# Patient Record
Sex: FEMALE | Race: WHITE
[De-identification: names, ages, dates, MRNs, and addresses within clinical notes are randomized per-mention and may not be internally consistent; named-entity substitution may affect disease eponyms.]

---

## 2017-01-21 ENCOUNTER — HOSPITAL ENCOUNTER (OUTPATIENT)
Age: 82
Discharge: HOME | End: 2017-01-21
Payer: MEDICARE

## 2017-01-21 DIAGNOSIS — M19.072: ICD-10-CM

## 2017-01-21 DIAGNOSIS — M79.672: Primary | ICD-10-CM

## 2017-03-14 ENCOUNTER — HOSPITAL ENCOUNTER (OUTPATIENT)
Dept: HOSPITAL 76 - DI | Age: 82
Discharge: HOME | End: 2017-03-14
Attending: FAMILY MEDICINE
Payer: MEDICARE

## 2017-03-14 DIAGNOSIS — M19.011: Primary | ICD-10-CM

## 2017-03-14 DIAGNOSIS — M19.021: ICD-10-CM

## 2017-03-15 NOTE — XRAY REPORT
THREE VIEW RIGHT ELBOW: 03/14/2017

 

CLINICAL HISTORY: Pain. 

 

FINDINGS:  Patchy bony demineralization is noted. 

 

There is no fracture, focus of destruction or malalignment. There is no intraarticular joint effusion
. 

 

IMPRESSION:  

1. NO ACUTE PROCESS.

2. MILD DEGENERATIVE CHANGES.

 

 

 

DD:03/15/2017 10:32:32  DT: 03/15/2017 10:50  JOB #: [id number]  EXT JOB #:[id number]

## 2017-03-15 NOTE — XRAY REPORT
THREE VIEW RIGHT SHOULDER: 03/14/2017

 

CLINICAL HISTORY: Pain. 

 

FINDINGS:  Degenerative changes are noted involving the glenohumeral joint with marked narrowing, sub
chondral sclerosis and osteophytic formation. Mild degenerative changes are present at the AC joint. 


 

There is no fracture or focus of destruction.

 

The soft tissues are within normal limits. 

 

The visualized lung apex is clear. 

 

NOTE: Diffuse demineralization does limit full characterization of the cortices. 

 

IMPRESSION:  ADVANCED DEGENERATIVE CHANGES OF THE GLENOHUMERAL JOINT. NO ACUTE FRACTURE OR MALALIGNME
NT.

 

 

 

DD:03/15/2017 10:32:00  DT: 03/15/2017 10:48  JOB #: [id number]  EXT JOB #:[id number]

## 2017-05-10 ENCOUNTER — HOSPITAL ENCOUNTER (OUTPATIENT)
Dept: HOSPITAL 76 - DI | Age: 82
Discharge: HOME | End: 2017-05-10
Attending: FAMILY MEDICINE
Payer: MEDICARE

## 2017-05-10 DIAGNOSIS — Z90.49: ICD-10-CM

## 2017-05-10 DIAGNOSIS — R10.9: Primary | ICD-10-CM

## 2017-05-10 PROCEDURE — 74020: CPT

## 2017-05-10 PROCEDURE — 71020: CPT

## 2017-05-10 NOTE — XRAY REPORT
TWO VIEW CHEST: 05/10/2017

 

CLINICAL INDICATION: Abdominal pain, possible foreign body. 

 

FINDINGS:  Frontal and lateral views of the chest demonstrate a normal cardiac silhouette. The lungs 
are clear. No effusion or pneumothorax is evident. 

 

IMPRESSION:  NO EVIDENCE OF ACUTE CARDIOPULMONARY DISEASE.

 

 

 

DD:05/10/2017 12:34:39  DT: 05/10/2017 12:47  JOB #: [id number]  EXT JOB #:[id number]

## 2017-05-10 NOTE — XRAY REPORT
TWO-VIEW ABDOMEN:  05/10/2017

 

CLINICAL INDICATION:  Pain, possible foreign body. 

 

FINDINGS:  Supine and upright views of the abdomen demonstrate a normal bowel gas pattern.  No small 
bowel dilatation is present.  Surgical clips from cholecystectomy are stable from CT of 09/16/2008.  
No radiopaque foreign body is appreciated in the soft tissues.  

 

IMPRESSION:  NO EVIDENCE OF BOWEL OBSTRUCTION OR PERFORATION.  POSTOPERATIVE CHANGES.  

 

 

 

DD:05/10/2017 13:24:12  DT: 05/10/2017 13:36  JOB #: [id number]  EXT JOB #:[id number]

## 2018-11-06 ENCOUNTER — HOSPITAL ENCOUNTER (EMERGENCY)
Dept: HOSPITAL 76 - ED | Age: 83
Discharge: HOME | End: 2018-11-06
Payer: MEDICARE

## 2018-11-06 VITALS — SYSTOLIC BLOOD PRESSURE: 143 MMHG | DIASTOLIC BLOOD PRESSURE: 70 MMHG

## 2018-11-06 DIAGNOSIS — E86.0: ICD-10-CM

## 2018-11-06 DIAGNOSIS — Z79.4: ICD-10-CM

## 2018-11-06 DIAGNOSIS — I48.91: ICD-10-CM

## 2018-11-06 DIAGNOSIS — L03.211: ICD-10-CM

## 2018-11-06 DIAGNOSIS — E11.65: ICD-10-CM

## 2018-11-06 DIAGNOSIS — T81.49XA: Primary | ICD-10-CM

## 2018-11-06 DIAGNOSIS — Z79.01: ICD-10-CM

## 2018-11-06 DIAGNOSIS — I10: ICD-10-CM

## 2018-11-06 LAB
ALBUMIN DIAFP-MCNC: 3.6 G/DL (ref 3.2–5.5)
ALBUMIN/GLOB SERPL: 1.1 {RATIO} (ref 1–2.2)
ALP SERPL-CCNC: 113 IU/L (ref 42–121)
ALT SERPL W P-5'-P-CCNC: 29 IU/L (ref 10–60)
ANION GAP SERPL CALCULATED.4IONS-SCNC: 9 MMOL/L (ref 6–13)
AST SERPL W P-5'-P-CCNC: 21 IU/L (ref 10–42)
BASOPHILS NFR BLD AUTO: 0 10^3/UL (ref 0–0.1)
BASOPHILS NFR BLD AUTO: 0.3 %
BILIRUB BLD-MCNC: 0.5 MG/DL (ref 0.2–1)
BUN SERPL-MCNC: 37 MG/DL (ref 6–20)
CALCIUM UR-MCNC: 8.9 MG/DL (ref 8.5–10.3)
CHLORIDE SERPL-SCNC: 95 MMOL/L (ref 101–111)
CLARITY UR REFRACT.AUTO: CLEAR
CO2 SERPL-SCNC: 28 MMOL/L (ref 21–32)
CREAT SERPLBLD-SCNC: 0.9 MG/DL (ref 0.4–1)
EOSINOPHIL # BLD AUTO: 0.1 10^3/UL (ref 0–0.7)
EOSINOPHIL NFR BLD AUTO: 1.3 %
ERYTHROCYTE [DISTWIDTH] IN BLOOD BY AUTOMATED COUNT: 19 % (ref 12–15)
GFRSERPLBLD MDRD-ARVRAT: 60 ML/MIN/{1.73_M2} (ref 89–?)
GLOBULIN SER-MCNC: 3.4 G/DL (ref 2.1–4.2)
GLUCOSE SERPL-MCNC: 219 MG/DL (ref 70–100)
GLUCOSE UR QL STRIP.AUTO: NEGATIVE MG/DL
HGB UR QL STRIP: 9 G/DL (ref 12–16)
INR PPP: 1.5 (ref 0.8–1.2)
KETONES UR QL STRIP.AUTO: NEGATIVE MG/DL
LIPASE SERPL-CCNC: 38 U/L (ref 22–51)
LYMPHOCYTES # SPEC AUTO: 0.8 10^3/UL (ref 1.5–3.5)
LYMPHOCYTES NFR BLD AUTO: 11.1 %
MCH RBC QN AUTO: 20.4 PG (ref 27–31)
MCHC RBC AUTO-ENTMCNC: 31 G/DL (ref 32–36)
MCV RBC AUTO: 65.7 FL (ref 81–99)
MONOCYTES # BLD AUTO: 0.9 10^3/UL (ref 0–1)
MONOCYTES NFR BLD AUTO: 12.2 %
NEUTROPHILS # BLD AUTO: 5.3 10^3/UL (ref 1.5–6.6)
NEUTROPHILS # SNV AUTO: 7.1 X10^3/UL (ref 4.8–10.8)
NEUTROPHILS NFR BLD AUTO: 75.1 %
NITRITE UR QL STRIP.AUTO: NEGATIVE
PDW BLD AUTO: 7.3 FL (ref 7.9–10.8)
PH UR STRIP.AUTO: 6 PH (ref 5–7.5)
PLAT MORPH BLD: (no result)
PLATELET # BLD: 274 10^3/UL (ref 130–450)
PLATELET BLD QL SMEAR: (no result)
PROT SPEC-MCNC: 7 G/DL (ref 6.7–8.2)
PROT UR STRIP.AUTO-MCNC: NEGATIVE MG/DL
PROTHROM ACT/NOR PPP: 16.3 SECS (ref 9.9–12.6)
RBC # UR STRIP.AUTO: NEGATIVE /UL
RBC # URNS HPF: (no result) /HPF (ref 0–5)
RBC MAR: 4.42 10^6/UL (ref 4.2–5.4)
RBC MORPH BLD: (no result)
SODIUM SERPLBLD-SCNC: 132 MMOL/L (ref 135–145)
SP GR UR STRIP.AUTO: 1.01 (ref 1–1.03)
SQUAMOUS URNS QL MICRO: (no result)
UROBILINOGEN UR QL STRIP.AUTO: (no result) E.U./DL
UROBILINOGEN UR STRIP.AUTO-MCNC: NEGATIVE MG/DL

## 2018-11-06 PROCEDURE — 83690 ASSAY OF LIPASE: CPT

## 2018-11-06 PROCEDURE — 99284 EMERGENCY DEPT VISIT MOD MDM: CPT

## 2018-11-06 PROCEDURE — 81001 URINALYSIS AUTO W/SCOPE: CPT

## 2018-11-06 PROCEDURE — 81003 URINALYSIS AUTO W/O SCOPE: CPT

## 2018-11-06 PROCEDURE — 80053 COMPREHEN METABOLIC PANEL: CPT

## 2018-11-06 PROCEDURE — 87086 URINE CULTURE/COLONY COUNT: CPT

## 2018-11-06 PROCEDURE — 85610 PROTHROMBIN TIME: CPT

## 2018-11-06 PROCEDURE — 71045 X-RAY EXAM CHEST 1 VIEW: CPT

## 2018-11-06 PROCEDURE — 36415 COLL VENOUS BLD VENIPUNCTURE: CPT

## 2018-11-06 PROCEDURE — 96360 HYDRATION IV INFUSION INIT: CPT

## 2018-11-06 PROCEDURE — 84484 ASSAY OF TROPONIN QUANT: CPT

## 2018-11-06 PROCEDURE — 93005 ELECTROCARDIOGRAM TRACING: CPT

## 2018-11-06 PROCEDURE — 85025 COMPLETE CBC W/AUTO DIFF WBC: CPT

## 2018-11-06 PROCEDURE — 83880 ASSAY OF NATRIURETIC PEPTIDE: CPT

## 2018-11-06 NOTE — ED PHYSICIAN DOCUMENTATION
History of Present Illness





- Stated complaint


Stated Complaint: DIZZY/WEAKNESS





- Chief complaint


Chief Complaint: General





- History obtained from


History obtained from: Patient, Family





- History of Present Illness


Timing: Today


Pain level max: 2


Pain level now: 2


Improved by: Rest


Worsened by: Walking





- Additonal information


Additional information: 





Patient is an 85-year-old female who presents to the emergency department 

stating that she had a basal cell carcinoma removed a few days ago and is 

concerned the wound is becoming infected.  Felt very weak today at home.  Has 

felt more out of breath with walking over the past few days.  Her blood sugars 

have been higher than usual. Does not have a history of acute coronary syndrome 

or congestive heart failure. No fevers.  No cough.  No vomiting.  No abdominal 

pain.





Review of Systems


Ten Systems: 10 systems reviewed and negative


Constitutional: denies: Fever, Chills


Ears: denies: Ear pain


Nose: denies: Rhinorrhea / runny nose, Congestion


Throat: denies: Sore throat


Cardiac: denies: Chest pain / pressure, Palpitations


Respiratory: denies: Cough, Hemoptysis, Wheezing


GI: denies: Abdominal Pain, Nausea, Vomiting


Skin: denies: Rash


Musculoskeletal: denies: Neck pain, Back pain





PD PAST MEDICAL HISTORY





- Past Medical History


Cardiovascular: Hypertension, High cholesterol, Atrial fibrillation


Respiratory: None


Endocrine/Autoimmune: Type 2 diabetes


GI: Hiatal hernia


: None


HEENT: None


Psych: None


Musculoskeletal: None


Derm: None





- Past Surgical History


Past Surgical History: Yes


General: Cholecystectomy, Appendectomy


Ortho: Other


/GYN: Hysterectomy


HEENT: Tonsil/Adenoidectomy





- Present Medications


Home Medications: 


                                Ambulatory Orders











 Medication  Instructions  Recorded  Confirmed


 


Ascorbic Acid [Vitamin C] 1,000 mg PO DAILY 10/14/14 12/10/15


 


Cholecalciferol (Vitamin D3) 1,000 unit PO DAILY 10/14/14 11/06/18





[Vitamin D]   


 


Digoxin [Lanoxin] 0.125 mg PO DAILY 10/14/14 11/06/18


 


Lansoprazole 15 mg PO DAILY 10/14/14 11/06/18


 


Lisinopril 10 mg PO DAILY 10/14/14 11/06/18


 


Magnesium Oxide [Magox 400] 400 mg PO DAILY 10/14/14 11/06/18


 


Multivitamin [Multi-Day Vitamins] 1 each PO DAILY 10/14/14 11/06/18


 


Omega-3/Dha/Epa/Fish Oil [Fish Oil] 1,000 mg PO BID 10/14/14 11/06/18


 


Vitamin E 400 unit PO DAILY 10/14/14 11/06/18


 


Warfarin [Coumadin] 7.5 mg PO 1400 10/14/14 11/06/18


 


Gluc/Wade-MSM#1/Vit C/Prem/Bor 1 tab ORAL DAILY 12/10/15 11/06/18





[Glucosa-Chond-MSM Complex Cplt]   


 


Insulin Glargine,Hum.rec.anlog 42 units SQ DAILY 12/10/15 11/06/18





[Lantus Solostar]   


 


Liraglutide [Victoza 2-Sam] 1.8 units SQ DAILY 12/10/15 11/06/18


 


Metoprolol Tartrate 50 mg ORAL Q8HR 12/10/15 11/06/18


 


Pravastatin Sodium 80 mg PO DAILY 12/10/15 11/06/18


 


Cephalexin [Keflex] 500 mg PO Q6H #28 capsule 11/06/18 


 


hydroCHLOROthiazide 0.5 tab ORAL DAILY 11/06/18 11/06/18





[Hydrochlorothiazide]   














- Allergies


Allergies/Adverse Reactions: 


                                    Allergies











Allergy/AdvReac Type Severity Reaction Status Date / Time


 


codeine AdvReac  Nausea Verified 11/06/18 12:09














- Social History


Does the pt smoke?: No


Smoking Status: Never smoker


Does the pt drink ETOH?: Yes


Does the pt have substance abuse?: No





- Immunizations


Immunizations are current?: Yes





PD ED PE NORMAL





- Vitals


Vital signs reviewed: Yes





- General


General: Alert and oriented X 3, No acute distress





- HEENT


HEENT: Other (Dry lips. Incision to the left upper forehead has moderate 

surrounding erythema, no drainage or fluctuance.  It is warm to the touch and 

painful.)





- Neck


Neck: Supple, no meningeal sign





- Cardiac


Cardiac: RRR, Strong equal pulses





- Respiratory


Respiratory: No respiratory distress, Clear bilaterally





- Abdomen


Abdomen: Soft, Non tender, Non distended





- Back


Back: No spinal TTP





- Derm


Derm: Warm and dry, No rash





- Extremities


Extremities: Other (1+ bilateral lower extremity edema)





- Neuro


Neuro: Alert and oriented X 3





- Psych


Psych: Normal mood, Normal affect





Results





- Vitals


Vitals: 


                               Vital Signs - 24 hr











  11/06/18 11/06/18 11/06/18





  12:01 12:30 13:11


 


Temperature 36.9 C  36.9 C


 


Heart Rate 74 67 70


 


Respiratory 16 13 16





Rate   


 


Blood Pressure 155/63 H 137/50 H 140/53 H


 


O2 Saturation 99 97 96














  11/06/18





  13:30


 


Temperature 


 


Heart Rate 71


 


Respiratory 16





Rate 


 


Blood Pressure 143/70 H


 


O2 Saturation 99








                                     Oxygen











O2 Source                      Room air

















- EKG (time done)


  ** 1217


Rate: Rate (enter#) (68)


Rhythm: NSR


Axis: Normal


Intervals: Normal SD


QRS: Normal


Ischemia: Normal ST segments, Q waves (V1-2)





- Labs


Labs: 


                                Laboratory Tests











  11/06/18 11/06/18 11/06/18





  12:05 12:05 12:05


 


WBC  7.1  


 


RBC  4.42  


 


Hgb  9.0 L  


 


Hct  29.0 L  


 


MCV  65.7 L  


 


MCH  20.4 L  


 


MCHC  31.0 L  


 


RDW  19.0 H  


 


Plt Count  274  


 


MPV  7.3 L  


 


Neut # (Auto)  5.3  


 


Lymph # (Auto)  0.8 L  


 


Mono # (Auto)  0.9  


 


Eos # (Auto)  0.1  


 


Baso # (Auto)  0.0  


 


Absolute Nucleated RBC  0.00  


 


Nucleated RBC %  0.1  


 


Manual Slide Review  Indicated  


 


Platelet Estimate  NORMAL (130-450,000)  


 


Platelet Morphology  NORMAL APPEARANCE  


 


RBC Morph Micro Appear  1+ OVALOCYTES  


 


PT   


 


INR   


 


Sodium   132 L 


 


Potassium   4.2 


 


Chloride   95 L 


 


Carbon Dioxide   28 


 


Anion Gap   9.0 


 


BUN   37 H 


 


Creatinine   0.9 


 


Estimated GFR (MDRD)   60 L 


 


Glucose   219 H 


 


POC Whole Bld Glucose   


 


Calcium   8.9 


 


Total Bilirubin   0.5 


 


AST   21 


 


ALT   29 


 


Alkaline Phosphatase   113 


 


Troponin I    < 0.04


 


B-Natriuretic Peptide   


 


Total Protein   7.0 


 


Albumin   3.6 


 


Globulin   3.4 


 


Albumin/Globulin Ratio   1.1 


 


Lipase   38 


 


Urine Color   


 


Urine Clarity   


 


Urine pH   


 


Ur Specific Gravity   


 


Urine Protein   


 


Urine Glucose (UA)   


 


Urine Ketones   


 


Urine Occult Blood   


 


Urine Nitrite   


 


Urine Bilirubin   


 


Urine Urobilinogen   


 


Ur Leukocyte Esterase   


 


Urine RBC   


 


Urine WBC   


 


Ur Squamous Epith Cells   


 


Urine Bacteria   


 


Ur Microscopic Review   


 


Urine Culture Comments   














  11/06/18 11/06/18 11/06/18





  12:05 12:05 12:07


 


WBC   


 


RBC   


 


Hgb   


 


Hct   


 


MCV   


 


MCH   


 


MCHC   


 


RDW   


 


Plt Count   


 


MPV   


 


Neut # (Auto)   


 


Lymph # (Auto)   


 


Mono # (Auto)   


 


Eos # (Auto)   


 


Baso # (Auto)   


 


Absolute Nucleated RBC   


 


Nucleated RBC %   


 


Manual Slide Review   


 


Platelet Estimate   


 


Platelet Morphology   


 


RBC Morph Micro Appear   


 


PT   16.3 H 


 


INR   1.5 H 


 


Sodium   


 


Potassium   


 


Chloride   


 


Carbon Dioxide   


 


Anion Gap   


 


BUN   


 


Creatinine   


 


Estimated GFR (MDRD)   


 


Glucose   


 


POC Whole Bld Glucose    220 H


 


Calcium   


 


Total Bilirubin   


 


AST   


 


ALT   


 


Alkaline Phosphatase   


 


Troponin I   


 


B-Natriuretic Peptide  43  


 


Total Protein   


 


Albumin   


 


Globulin   


 


Albumin/Globulin Ratio   


 


Lipase   


 


Urine Color   


 


Urine Clarity   


 


Urine pH   


 


Ur Specific Gravity   


 


Urine Protein   


 


Urine Glucose (UA)   


 


Urine Ketones   


 


Urine Occult Blood   


 


Urine Nitrite   


 


Urine Bilirubin   


 


Urine Urobilinogen   


 


Ur Leukocyte Esterase   


 


Urine RBC   


 


Urine WBC   


 


Ur Squamous Epith Cells   


 


Urine Bacteria   


 


Ur Microscopic Review   


 


Urine Culture Comments   














  11/06/18





  14:00


 


WBC 


 


RBC 


 


Hgb 


 


Hct 


 


MCV 


 


MCH 


 


MCHC 


 


RDW 


 


Plt Count 


 


MPV 


 


Neut # (Auto) 


 


Lymph # (Auto) 


 


Mono # (Auto) 


 


Eos # (Auto) 


 


Baso # (Auto) 


 


Absolute Nucleated RBC 


 


Nucleated RBC % 


 


Manual Slide Review 


 


Platelet Estimate 


 


Platelet Morphology 


 


RBC Morph Micro Appear 


 


PT 


 


INR 


 


Sodium 


 


Potassium 


 


Chloride 


 


Carbon Dioxide 


 


Anion Gap 


 


BUN 


 


Creatinine 


 


Estimated GFR (MDRD) 


 


Glucose 


 


POC Whole Bld Glucose 


 


Calcium 


 


Total Bilirubin 


 


AST 


 


ALT 


 


Alkaline Phosphatase 


 


Troponin I 


 


B-Natriuretic Peptide 


 


Total Protein 


 


Albumin 


 


Globulin 


 


Albumin/Globulin Ratio 


 


Lipase 


 


Urine Color  YELLOW


 


Urine Clarity  CLEAR


 


Urine pH  6.0


 


Ur Specific Gravity  1.010


 


Urine Protein  NEGATIVE


 


Urine Glucose (UA)  NEGATIVE


 


Urine Ketones  NEGATIVE


 


Urine Occult Blood  NEGATIVE


 


Urine Nitrite  NEGATIVE


 


Urine Bilirubin  NEGATIVE


 


Urine Urobilinogen  0.2 (NORMAL)


 


Ur Leukocyte Esterase  TRACE H


 


Urine RBC  0-5


 


Urine WBC  4-5


 


Ur Squamous Epith Cells  NONE SEEN


 


Urine Bacteria  None Seen


 


Ur Microscopic Review  INDICATED


 


Urine Culture Comments  INDICATED














- Rads (name of study)


  ** cxr


Radiology: Prelim report reviewed, EMP read contemporaneously, See rad report 

(normal)





PD MEDICAL DECISION MAKING





- ED course


Complexity details: reviewed results, re-evaluated patient, considered 

differential, d/w patient, d/w family


ED course: 





Patient is an 85-year-old female with what appears to be hyperglycemia leading 

to dehydration as well as a mild cellulitis to recent surgical wound on her left

 upper forehead.  Will place on Keflex.  Feels much better after IV fluids.  

Tolerating p.o. without difficulty.  No focal neurological deficits.  No acute 

laboratory abnormalities.  She is well-appearing, nontoxic.  No evidence of 

sepsis.  Patient counseled regarding signs and symptoms for which I believe and 

urgent re-evaluation would be necessary. Patient with good understanding of and 

agreement to plan and is comfortable going home at this time





This document was made in part using voice recognition software. While efforts 

are made to proofread this document, sound alike and grammatical errors may occu

rJohn





Departure





- Departure


Disposition: 01 Home, Self Care


Clinical Impression: 


 Wound infection after surgery, Dehydration





Cellulitis


Qualifiers:


 Site of cellulitis: face Qualified Code(s): L03.211 - Cellulitis of face





Condition: Good


Instructions:  ED Infec Skin Cellulitis


Follow-Up: 


Michael Hester MD [Primary Care Provider] - Within 3 Days


Prescriptions: 


Cephalexin [Keflex] 500 mg PO Q6H #28 capsule


Comments: 


Take all antibiotics until gone. Return if you worsen. 


Discharge Date/Time: 11/06/18 14:15

## 2018-11-06 NOTE — XRAY REPORT
Reason:  dyspnea

Procedure Date:  11/06/2018   

Accession Number:  [id number] / [id number]                    

Procedure:  XR  - Chest 1 View X-Ray CPT Code:  30639

 

FULL RESULT:

 

 

EXAM:

CHEST RADIOGRAPHY

 

EXAM DATE: 11/6/2018 01:18 PM.

 

CLINICAL HISTORY: Dyspnea.

 

COMPARISON: CHEST 2 VIEW PA/LAT 05/10/2017 11:59 AM.

 

TECHNIQUE: 1 view.

 

FINDINGS:

Lungs/Pleura: No focal opacities evident. No pleural effusion. No 

pneumothorax.

 

Mediastinum: Within exam limitations, the cardiomediastinal contour is 

normal.

 

Other: None.

 

IMPRESSION: Normal single view chest.

 

[facility]

## 2018-11-23 ENCOUNTER — HOSPITAL ENCOUNTER (OUTPATIENT)
Dept: HOSPITAL 76 - EMS | Age: 83
End: 2018-11-23
Attending: SURGERY
Payer: MEDICARE

## 2018-11-23 DIAGNOSIS — Z03.89: Primary | ICD-10-CM

## 2020-05-13 ENCOUNTER — HOSPITAL ENCOUNTER (OUTPATIENT)
Dept: HOSPITAL 76 - COV | Age: 85
Discharge: HOME | End: 2020-05-13
Attending: FAMILY MEDICINE
Payer: MEDICARE

## 2020-05-13 DIAGNOSIS — R06.02: ICD-10-CM

## 2020-05-13 DIAGNOSIS — R53.83: ICD-10-CM

## 2020-05-13 DIAGNOSIS — J02.9: ICD-10-CM

## 2020-05-13 DIAGNOSIS — R05: Primary | ICD-10-CM

## 2020-05-13 PROCEDURE — 81599 UNLISTED MAAA: CPT

## 2020-08-15 ENCOUNTER — HOSPITAL ENCOUNTER (EMERGENCY)
Dept: HOSPITAL 76 - ED | Age: 85
Discharge: HOME | End: 2020-08-15
Payer: MEDICARE

## 2020-08-15 VITALS — SYSTOLIC BLOOD PRESSURE: 149 MMHG | DIASTOLIC BLOOD PRESSURE: 50 MMHG

## 2020-08-15 DIAGNOSIS — R94.31: ICD-10-CM

## 2020-08-15 DIAGNOSIS — Z79.01: ICD-10-CM

## 2020-08-15 DIAGNOSIS — E11.9: ICD-10-CM

## 2020-08-15 DIAGNOSIS — Z79.4: ICD-10-CM

## 2020-08-15 DIAGNOSIS — I48.91: ICD-10-CM

## 2020-08-15 DIAGNOSIS — R06.01: ICD-10-CM

## 2020-08-15 DIAGNOSIS — R06.09: ICD-10-CM

## 2020-08-15 DIAGNOSIS — E86.0: Primary | ICD-10-CM

## 2020-08-15 DIAGNOSIS — I10: ICD-10-CM

## 2020-08-15 LAB
ALBUMIN DIAFP-MCNC: 3.9 G/DL (ref 3.2–5.5)
ALBUMIN/GLOB SERPL: 1.3 {RATIO} (ref 1–2.2)
ALP SERPL-CCNC: 72 IU/L (ref 42–121)
ALT SERPL W P-5'-P-CCNC: 17 IU/L (ref 10–60)
ANION GAP SERPL CALCULATED.4IONS-SCNC: 10 MMOL/L (ref 6–13)
AST SERPL W P-5'-P-CCNC: 22 IU/L (ref 10–42)
BASOPHILS NFR BLD AUTO: 0 10^3/UL (ref 0–0.1)
BASOPHILS NFR BLD AUTO: 0.5 %
BILIRUB BLD-MCNC: 0.5 MG/DL (ref 0.2–1)
BUN SERPL-MCNC: 37 MG/DL (ref 6–20)
CALCIUM UR-MCNC: 9 MG/DL (ref 8.5–10.3)
CHLORIDE SERPL-SCNC: 101 MMOL/L (ref 101–111)
CLARITY UR REFRACT.AUTO: CLEAR
CO2 SERPL-SCNC: 26 MMOL/L (ref 21–32)
CREAT SERPLBLD-SCNC: 0.8 MG/DL (ref 0.4–1)
EOSINOPHIL # BLD AUTO: 0.1 10^3/UL (ref 0–0.7)
EOSINOPHIL NFR BLD AUTO: 2.2 %
ERYTHROCYTE [DISTWIDTH] IN BLOOD BY AUTOMATED COUNT: 19.9 % (ref 12–15)
GLOBULIN SER-MCNC: 2.9 G/DL (ref 2.1–4.2)
GLUCOSE SERPL-MCNC: 108 MG/DL (ref 70–100)
GLUCOSE UR QL STRIP.AUTO: NEGATIVE MG/DL
HGB UR QL STRIP: 8.1 G/DL (ref 12–16)
INR PPP: 3.2 (ref 0.8–1.2)
KETONES UR QL STRIP.AUTO: NEGATIVE MG/DL
LIPASE SERPL-CCNC: 44 U/L (ref 22–51)
LYMPHOCYTES # SPEC AUTO: 1.2 10^3/UL (ref 1.5–3.5)
LYMPHOCYTES NFR BLD AUTO: 19.7 %
MCH RBC QN AUTO: 17.8 PG (ref 27–31)
MCHC RBC AUTO-ENTMCNC: 27.5 G/DL (ref 32–36)
MCV RBC AUTO: 64.8 FL (ref 81–99)
MONOCYTES # BLD AUTO: 0.7 10^3/UL (ref 0–1)
MONOCYTES NFR BLD AUTO: 11.3 %
NEUTROPHILS # BLD AUTO: 3.9 10^3/UL (ref 1.5–6.6)
NEUTROPHILS # SNV AUTO: 5.9 X10^3/UL (ref 4.8–10.8)
NEUTROPHILS NFR BLD AUTO: 66 %
NITRITE UR QL STRIP.AUTO: NEGATIVE
PDW BLD AUTO: 9.5 FL (ref 7.9–10.8)
PH UR STRIP.AUTO: 7 PH (ref 5–7.5)
PLATELET # BLD: 259 10^3/UL (ref 130–450)
PROT SPEC-MCNC: 6.8 G/DL (ref 6.7–8.2)
PROT UR STRIP.AUTO-MCNC: NEGATIVE MG/DL
PROTHROM ACT/NOR PPP: 33.7 SECS (ref 9.9–12.6)
RBC # UR STRIP.AUTO: NEGATIVE /UL
RBC MAR: 4.55 10^6/UL (ref 4.2–5.4)
SODIUM SERPLBLD-SCNC: 137 MMOL/L (ref 135–145)
SP GR UR STRIP.AUTO: 1.02 (ref 1–1.03)
UROBILINOGEN UR QL STRIP.AUTO: (no result) E.U./DL
UROBILINOGEN UR STRIP.AUTO-MCNC: NEGATIVE MG/DL

## 2020-08-15 PROCEDURE — 85025 COMPLETE CBC W/AUTO DIFF WBC: CPT

## 2020-08-15 PROCEDURE — 81001 URINALYSIS AUTO W/SCOPE: CPT

## 2020-08-15 PROCEDURE — 36415 COLL VENOUS BLD VENIPUNCTURE: CPT

## 2020-08-15 PROCEDURE — 93005 ELECTROCARDIOGRAM TRACING: CPT

## 2020-08-15 PROCEDURE — 83880 ASSAY OF NATRIURETIC PEPTIDE: CPT

## 2020-08-15 PROCEDURE — 85610 PROTHROMBIN TIME: CPT

## 2020-08-15 PROCEDURE — 81003 URINALYSIS AUTO W/O SCOPE: CPT

## 2020-08-15 PROCEDURE — 96360 HYDRATION IV INFUSION INIT: CPT

## 2020-08-15 PROCEDURE — 87086 URINE CULTURE/COLONY COUNT: CPT

## 2020-08-15 PROCEDURE — 83690 ASSAY OF LIPASE: CPT

## 2020-08-15 PROCEDURE — 84484 ASSAY OF TROPONIN QUANT: CPT

## 2020-08-15 PROCEDURE — 71045 X-RAY EXAM CHEST 1 VIEW: CPT

## 2020-08-15 PROCEDURE — 80053 COMPREHEN METABOLIC PANEL: CPT

## 2020-08-15 PROCEDURE — 99284 EMERGENCY DEPT VISIT MOD MDM: CPT

## 2020-08-15 NOTE — ED PHYSICIAN DOCUMENTATION
PD HPI DYSPNEA





- Stated complaint


Stated Complaint: SOA





- Chief complaint


Chief Complaint: Resp





- History obtained from


History obtained from: Patient





- History of Present Illness


Timing - onset: Last night


Timing - onset during: Rest


Timing - duration: Hours


Timing - details: Intermittant


Improved by: Rest, Sitting up


Worsened by: Exertion, Laying flat


Associated symptoms: No: Fever, Cough, Hemoptysis, Wheezing, Chest pain / 

discomfort, Palpitations, Diaphoresis, Bilateral edema, Unilateral edema


Similar symptoms before: No diagnosis


Recently seen: Clinic





- Additional information


Additional information: 





86-year-old diabetic female with a history of atrial fibrillation was on 

Coumadin has developed exertional dyspnea that is progressively worsening as w

ell as some orthopnea.  She indicates that she will have to go out and sit in 

her recliner at night to sleep.  She is having to sit up at the side of the bed 

to catch her breath.  She denies any use of Lasix and states that she is on some

hydrochlorothiazide.  She does not have a history of congestive heart failure 

that she is aware of.  She states that her legs are not currently swollen.  She 

is not currently short of breath in the emergency department.





Review of Systems


Constitutional: denies: Fever


Eyes: denies: Decreased vision


Ears: denies: Ear pain


Nose: denies: Rhinorrhea / runny nose, Congestion


Throat: denies: Dental pain / toothache, Oral lesions / sores


Cardiac: denies: Chest pain / pressure, Palpitations, Pedal edema, Calf pain


Respiratory: reports: Dyspnea.  denies: Cough, Hemoptysis, Wheezing


GI: denies: Abdominal Pain, Nausea, Vomiting


: denies: Dysuria, Frequency





PD PAST MEDICAL HISTORY





- Past Medical History


Past Medical History: Yes


Cardiovascular: Hypertension, High cholesterol, Atrial fibrillation


Respiratory: None


Endocrine/Autoimmune: Type 2 diabetes


GI: Hiatal hernia


: None


HEENT: None


Psych: None


Musculoskeletal: None


Derm: None





- Past Surgical History


Past Surgical History: Yes


General: Cholecystectomy, Appendectomy


Ortho: Other


/GYN: Hysterectomy


HEENT: Tonsil/Adenoidectomy





- Present Medications


Home Medications: 


                                Ambulatory Orders











 Medication  Instructions  Recorded  Confirmed


 


Ascorbic Acid [Vitamin C] 1,000 mg PO DAILY 10/14/14 12/10/15


 


Cholecalciferol (Vitamin D3) 1,000 unit PO DAILY 10/14/14 11/06/18





[Vitamin D]   


 


Digoxin [Lanoxin] 0.125 mg PO DAILY 10/14/14 11/06/18


 


Lansoprazole 15 mg PO DAILY 10/14/14 11/06/18


 


Lisinopril 10 mg PO DAILY 10/14/14 11/06/18


 


Magnesium Oxide [Magox 400] 400 mg PO DAILY 10/14/14 11/06/18


 


Multivitamin [Multi-Day Vitamins] 1 each PO DAILY 10/14/14 11/06/18


 


Omega-3/Dha/Epa/Fish Oil [Fish Oil] 1,000 mg PO BID 10/14/14 11/06/18


 


Vitamin E 400 unit PO DAILY 10/14/14 11/06/18


 


Warfarin [Coumadin] 7.5 mg PO 1400 10/14/14 11/06/18


 


Glucosam/Chond-Msm1/C/Prem/Bor 1 tab ORAL DAILY 12/10/15 11/06/18





[Glucosa-Chond-MSM Complex Cplt]   


 


Insulin Glargine,Hum.rec.anlog 42 units SQ DAILY 12/10/15 11/06/18





[Lantus Solostar]   


 


Liraglutide [Victoza 2-Sam] 1.8 units SQ DAILY 12/10/15 11/06/18


 


Metoprolol Tartrate 50 mg ORAL Q8HR 12/10/15 11/06/18


 


Pravastatin Sodium 80 mg PO DAILY 12/10/15 11/06/18


 


Cephalexin [Keflex] 500 mg PO Q6H #28 capsule 11/06/18 


 


hydroCHLOROthiazide 0.5 tab ORAL DAILY 11/06/18 11/06/18





[Hydrochlorothiazide]   














- Allergies


Allergies/Adverse Reactions: 


                                    Allergies











Allergy/AdvReac Type Severity Reaction Status Date / Time


 


codeine AdvReac  Nausea Verified 08/15/20 06:32














- Social History


Does the pt smoke?: No


Smoking Status: Never smoker


Does the pt drink ETOH?: Yes


Does the pt have substance abuse?: No





- Immunizations


Immunizations are current?: Yes





- POLST


Patient has POLST: No





PD ED PE NORMAL





- Vitals


Vital signs reviewed: Yes (hypertensive )





- General


General: Alert and oriented X 3, No acute distress, Well developed/nourished





- HEENT


HEENT: Atraumatic, PERRL, EOMI





- Neck


Neck: Supple, no meningeal sign, No bony TTP





- Cardiac


Cardiac: RRR, No murmur





- Respiratory


Respiratory: No respiratory distress, Clear bilaterally





- Abdomen


Abdomen: Normal bowel sounds, Soft, Non tender, Non distended, No organomegaly





- Back


Back: No CVA TTP, No spinal TTP





- Derm


Derm: Normal color, Warm and dry, No rash





- Extremities


Extremities: No deformity, No tenderness to palpate, Normal ROM s pain, No 

edema, No calf tenderness / cord





- Neuro


Neuro: Alert and oriented X 3


Eye Opening: To Voice


Motor: Obeys Commands


Verbal: Oriented


GCS Score: 14





- Psych


Psych: Normal mood, Normal affect





Results





- Vitals


Vitals: 


                               Vital Signs - 24 hr











  08/15/20 08/15/20





  06:20 08:31


 


Temperature 37.2 C 


 


Heart Rate 77 74


 


Respiratory 16 22





Rate  


 


Blood Pressure 170/68 H 166/57 H


 


O2 Saturation 98 97








                                     Oxygen











O2 Source                      Room air

















- EKG (time done)


  ** 0749


Rate: Rate (enter#) (72)


Ischemia: Q waves (consistent with inferior and anterior infarcts)


Compare to prior EKG: Changed from prior EKG (SPT 11/06/2018 the inferior q 

waves have developed. )


Computer interpretation: Agree with computer





- Labs


Labs: 


                                Laboratory Tests











  08/15/20 08/15/20 08/15/20





  08:00 08:00 08:00


 


WBC  5.9  


 


RBC  4.55  


 


Hgb  8.1 L  


 


Hct  29.5 L  


 


MCV  64.8 L  


 


MCH  17.8 L  


 


MCHC  27.5 L  


 


RDW  19.9 H  


 


Plt Count  259  


 


MPV  9.5  


 


Neut # (Auto)  3.9  


 


Lymph # (Auto)  1.2 L  


 


Mono # (Auto)  0.7  


 


Eos # (Auto)  0.1  


 


Baso # (Auto)  0.0  


 


Absolute Nucleated RBC  0.00  


 


Nucleated RBC %  0.0  


 


PT   33.7 H 


 


INR   3.2 H 


 


Sodium    137


 


Potassium    3.8


 


Chloride    101


 


Carbon Dioxide    26


 


Anion Gap    10.0


 


BUN    37 H


 


Creatinine    0.8


 


Estimated GFR (MDRD)    68 L


 


Glucose    108 H


 


Calcium    9.0


 


Total Bilirubin    0.5


 


AST    22


 


ALT    17


 


Alkaline Phosphatase    72


 


Troponin I High Sens   


 


B-Natriuretic Peptide   


 


Total Protein    6.8


 


Albumin    3.9


 


Globulin    2.9


 


Albumin/Globulin Ratio    1.3


 


Lipase    44


 


Urine Color   


 


Urine Clarity   


 


Urine pH   


 


Ur Specific Gravity   


 


Urine Protein   


 


Urine Glucose (UA)   


 


Urine Ketones   


 


Urine Occult Blood   


 


Urine Nitrite   


 


Urine Bilirubin   


 


Urine Urobilinogen   


 


Ur Leukocyte Esterase   


 


Ur Microscopic Review   


 


Urine Culture Comments   














  08/15/20 08/15/20 08/15/20





  08:00 08:00 08:13


 


WBC   


 


RBC   


 


Hgb   


 


Hct   


 


MCV   


 


MCH   


 


MCHC   


 


RDW   


 


Plt Count   


 


MPV   


 


Neut # (Auto)   


 


Lymph # (Auto)   


 


Mono # (Auto)   


 


Eos # (Auto)   


 


Baso # (Auto)   


 


Absolute Nucleated RBC   


 


Nucleated RBC %   


 


PT   


 


INR   


 


Sodium   


 


Potassium   


 


Chloride   


 


Carbon Dioxide   


 


Anion Gap   


 


BUN   


 


Creatinine   


 


Estimated GFR (MDRD)   


 


Glucose   


 


Calcium   


 


Total Bilirubin   


 


AST   


 


ALT   


 


Alkaline Phosphatase   


 


Troponin I High Sens  5.9  


 


B-Natriuretic Peptide   52 


 


Total Protein   


 


Albumin   


 


Globulin   


 


Albumin/Globulin Ratio   


 


Lipase   


 


Urine Color    YELLOW


 


Urine Clarity    CLEAR


 


Urine pH    7.0


 


Ur Specific Gravity    1.020


 


Urine Protein    NEGATIVE


 


Urine Glucose (UA)    NEGATIVE


 


Urine Ketones    NEGATIVE


 


Urine Occult Blood    NEGATIVE


 


Urine Nitrite    NEGATIVE


 


Urine Bilirubin    NEGATIVE


 


Urine Urobilinogen    0.2 (NORMAL)


 


Ur Leukocyte Esterase    NEGATIVE


 


Ur Microscopic Review    NOT INDICATED


 


Urine Culture Comments    NOT INDICATED














- Rads (name of study)


  ** chest


Radiology: Prelim report reviewed (Impression: No evidence of acute pulmonary 

process.), EMP read indepedently, See rad report





PD MEDICAL DECISION MAKING





- ED course


Complexity details: reviewed old records, reviewed results, re-evaluated 

patient, considered differential, d/w patient


ED course: 





86-year-old female with exertional dyspnea and orthopnea does not evidence of 

acute failure. Her BNP is negative, her IVC is collapsing completely and her CXR

 is without evidence of edema. I suspect she may have some diastolic dysfunction

 and we have given her a 500ml bolus of saline. I have recommended she follow up

 with a cardiologist and have an echo performed as well. 





Departure





- Departure


Disposition: 01 Home, Self Care


Clinical Impression: 


 Dehydration





Dyspnea


Qualifiers:


 Dyspnea type: dyspnea on exertion Qualified Code(s): R06.00 - Dyspnea, 

unspecified





Condition: Stable


Instructions:  ED Dehydration, ED Dyspnea Shortness of Breath


Follow-Up: 


Michael Hester MD [Primary Care Provider] - 


Comments: 


The symptoms you are having are consistent with congestive heart failure. Today 

we suspect this may be related to something called diastolic dysfunction.  When 

there is not enough fluid in your system your heart will not work normally.  We 

have given you some extra fluid.  A follow-up with your cardiologist is 

recommended and an echocardiogram should be obtained.  Follow-up with Dr. Hester for referral.

## 2020-08-15 NOTE — XRAY REPORT
PROCEDURE:  Chest 1 View X-Ray

 

INDICATIONS:  chest pain

 

TECHNIQUE:  One view of the chest was acquired.  

 

COMPARISON:  11/6/2018

 

FINDINGS:  

 

Surgical changes and devices:  None.  

 

Lungs and pleura:  No pleural effusions or pneumothorax.  Lungs are clear.  

 

Mediastinum:  Mediastinal contours appear normal.  Heart size is normal.  

 

Bones and chest wall:  No suspicious bony lesions.  Overlying soft tissues appear unremarkable.  

 

IMPRESSION:  

No evidence acute pulmonary process.

 

Reviewed by: German Kwon MD on 8/15/2020 8:11 AM PDT

Approved by: German Kown MD on 8/15/2020 8:11 AM PDT

 

 

Station ID:  [id number]

## 2020-09-09 ENCOUNTER — HOSPITAL ENCOUNTER (EMERGENCY)
Dept: HOSPITAL 76 - ED | Age: 85
Discharge: ANOTHER HOSPITAL | End: 2020-09-09
Payer: MEDICARE

## 2020-09-09 ENCOUNTER — HOSPITAL ENCOUNTER (OUTPATIENT)
Dept: HOSPITAL 76 - EMS | Age: 85
Discharge: ANOTHER HOSPITAL | End: 2020-09-09
Attending: SURGERY
Payer: MEDICARE

## 2020-09-09 VITALS — DIASTOLIC BLOOD PRESSURE: 68 MMHG | SYSTOLIC BLOOD PRESSURE: 162 MMHG

## 2020-09-09 DIAGNOSIS — I48.91: ICD-10-CM

## 2020-09-09 DIAGNOSIS — E11.9: ICD-10-CM
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
## 2020-09-09 DIAGNOSIS — I61.8: ICD-10-CM

## 2020-09-09 DIAGNOSIS — Z91.81: ICD-10-CM

## 2020-09-09 DIAGNOSIS — H11.31: Primary | ICD-10-CM

## 2020-09-09 DIAGNOSIS — E78.00: ICD-10-CM
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
## 2020-09-09 DIAGNOSIS — S09.90XA: Primary | ICD-10-CM

## 2020-09-09 DIAGNOSIS — Z79.01: ICD-10-CM

## 2020-09-09 DIAGNOSIS — R55: ICD-10-CM

## 2020-09-09 DIAGNOSIS — I10: ICD-10-CM

## 2020-09-09 LAB
ALBUMIN DIAFP-MCNC: 3.7 G/DL (ref 3.2–5.5)
ALBUMIN/GLOB SERPL: 1.4 {RATIO} (ref 1–2.2)
ALP SERPL-CCNC: 59 IU/L (ref 42–121)
ALT SERPL W P-5'-P-CCNC: 18 IU/L (ref 10–60)
ANION GAP SERPL CALCULATED.4IONS-SCNC: 9 MMOL/L (ref 6–13)
APTT PPP: 30.3 SECS (ref 24.9–33.3)
AST SERPL W P-5'-P-CCNC: 24 IU/L (ref 10–42)
BASOPHILS NFR BLD AUTO: 0 10^3/UL (ref 0–0.1)
BASOPHILS NFR BLD AUTO: 0.4 %
BILIRUB BLD-MCNC: 0.5 MG/DL (ref 0.2–1)
BUN SERPL-MCNC: 30 MG/DL (ref 6–20)
CALCIUM UR-MCNC: 8.8 MG/DL (ref 8.5–10.3)
CHLORIDE SERPL-SCNC: 101 MMOL/L (ref 101–111)
CK SERPL-CCNC: 100 IU/L (ref 22–269)
CLARITY UR REFRACT.AUTO: CLEAR
CO2 SERPL-SCNC: 25 MMOL/L (ref 21–32)
CREAT SERPLBLD-SCNC: 1.1 MG/DL (ref 0.4–1)
EOSINOPHIL # BLD AUTO: 0.1 10^3/UL (ref 0–0.7)
EOSINOPHIL NFR BLD AUTO: 2.4 %
ERYTHROCYTE [DISTWIDTH] IN BLOOD BY AUTOMATED COUNT: 30.1 % (ref 12–15)
GLOBULIN SER-MCNC: 2.7 G/DL (ref 2.1–4.2)
GLUCOSE SERPL-MCNC: 109 MG/DL (ref 70–100)
GLUCOSE UR QL STRIP.AUTO: NEGATIVE MG/DL
HGB UR QL STRIP: 9.1 G/DL (ref 12–16)
INR PPP: 1.7 (ref 0.8–1.2)
KETONES UR QL STRIP.AUTO: (no result) MG/DL
LIPASE SERPL-CCNC: 54 U/L (ref 22–51)
LYMPHOCYTES # SPEC AUTO: 0.7 10^3/UL (ref 1.5–3.5)
LYMPHOCYTES NFR BLD AUTO: 14.1 %
MCH RBC QN AUTO: 21.1 PG (ref 27–31)
MCHC RBC AUTO-ENTMCNC: 27.7 G/DL (ref 32–36)
MCV RBC AUTO: 76.3 FL (ref 81–99)
MONOCYTES # BLD AUTO: 0.5 10^3/UL (ref 0–1)
MONOCYTES NFR BLD AUTO: 9.9 %
NEUTROPHILS # BLD AUTO: 3.7 10^3/UL (ref 1.5–6.6)
NEUTROPHILS # SNV AUTO: 5 X10^3/UL (ref 4.8–10.8)
NEUTROPHILS NFR BLD AUTO: 72.8 %
NITRITE UR QL STRIP.AUTO: NEGATIVE
PDW BLD AUTO: 9.2 FL (ref 7.9–10.8)
PH UR STRIP.AUTO: 5.5 PH (ref 5–7.5)
PLAT MORPH BLD: (no result)
PLATELET # BLD: 230 10^3/UL (ref 130–450)
PLATELET BLD QL SMEAR: (no result)
PROT SPEC-MCNC: 6.4 G/DL (ref 6.7–8.2)
PROT UR STRIP.AUTO-MCNC: NEGATIVE MG/DL
PROTHROM ACT/NOR PPP: 18.1 SECS (ref 9.9–12.6)
RBC # UR STRIP.AUTO: NEGATIVE /UL
RBC MAR: 4.31 10^6/UL (ref 4.2–5.4)
RBC MORPH BLD: (no result)
SODIUM SERPLBLD-SCNC: 135 MMOL/L (ref 135–145)
SP GR UR STRIP.AUTO: 1.02 (ref 1–1.03)
SQUAMOUS URNS QL MICRO: (no result)
UROBILINOGEN UR QL STRIP.AUTO: (no result) E.U./DL
UROBILINOGEN UR STRIP.AUTO-MCNC: NEGATIVE MG/DL

## 2020-09-09 PROCEDURE — 84484 ASSAY OF TROPONIN QUANT: CPT

## 2020-09-09 PROCEDURE — 70450 CT HEAD/BRAIN W/O DYE: CPT

## 2020-09-09 PROCEDURE — 85025 COMPLETE CBC W/AUTO DIFF WBC: CPT

## 2020-09-09 PROCEDURE — 72125 CT NECK SPINE W/O DYE: CPT

## 2020-09-09 PROCEDURE — 81001 URINALYSIS AUTO W/SCOPE: CPT

## 2020-09-09 PROCEDURE — 71045 X-RAY EXAM CHEST 1 VIEW: CPT

## 2020-09-09 PROCEDURE — 99285 EMERGENCY DEPT VISIT HI MDM: CPT

## 2020-09-09 PROCEDURE — 87086 URINE CULTURE/COLONY COUNT: CPT

## 2020-09-09 PROCEDURE — 85730 THROMBOPLASTIN TIME PARTIAL: CPT

## 2020-09-09 PROCEDURE — 82550 ASSAY OF CK (CPK): CPT

## 2020-09-09 PROCEDURE — 83690 ASSAY OF LIPASE: CPT

## 2020-09-09 PROCEDURE — 83605 ASSAY OF LACTIC ACID: CPT

## 2020-09-09 PROCEDURE — 96374 THER/PROPH/DIAG INJ IV PUSH: CPT

## 2020-09-09 PROCEDURE — 83880 ASSAY OF NATRIURETIC PEPTIDE: CPT

## 2020-09-09 PROCEDURE — 70486 CT MAXILLOFACIAL W/O DYE: CPT

## 2020-09-09 PROCEDURE — 80053 COMPREHEN METABOLIC PANEL: CPT

## 2020-09-09 PROCEDURE — 81003 URINALYSIS AUTO W/O SCOPE: CPT

## 2020-09-09 PROCEDURE — 85610 PROTHROMBIN TIME: CPT

## 2020-09-09 PROCEDURE — 36415 COLL VENOUS BLD VENIPUNCTURE: CPT

## 2020-09-09 PROCEDURE — 93005 ELECTROCARDIOGRAM TRACING: CPT

## 2020-09-09 NOTE — CT REPORT
PROCEDURE:  MAXILLOFACIAL WO

 

INDICATIONS:  fall face injury

 

TECHNIQUE:  

Noncontrast 1.5 mm thick axial images acquired from the mandible through the frontal sinuses, with co
syeda and sagittal reformatting.  For radiation dose reduction, the following was used:  automated ex
posure control, adjustment of mA and/or kV according to patient size.

 

COMPARISON:  None.

 

FINDINGS:  

Image quality:  Excellent.  

 

Bones and teeth:  Orbital walls are intact.  Sinus walls show no fracture or deformity.  Nasal bones 
and septum are intact.  Visualized portions of the mandible demonstrate no fractures or subluxation. 
 Zygomatic arches are intact.  Pterygoid plates are intact.  Visualized portions of the skull base an
d auditory canals are intact.  

 

Sinuses: Mild mucosal thickening of the bilateral maxillary sinuses and sphenoid sinuses. Remainder t
he visualized paranasal sinuses are aerated, without fluid levels, mucosal thickening, or mucoceles. 
 Mastoid air cells are aerated.  

 

Soft tissues:  Superficial soft tissue contusion involving the right cheek without underlying facial 
fractures. No masses, or fluid collections.  No enlarged lymph nodes.  No soft tissue lacerations or 
debris.  

 

Vascular:  Visualized vascular structures appear normal in the absence of contrast.  Bony vascular fo
ramina and canals are intact.  

 

IMPRESSION:  

1. Right cheek soft tissue swelling/contusion without underlying facial fractures.

2. Mild bilateral maxillary and ethmoid sinus disease.

 

 

Findings were discussed with  of the emergency Department staff at 2017 hours.

 

Reviewed by: Tico Granado MD on 9/9/2020 8:19 PM PDT

Approved by: Tico Granado MD on 9/9/2020 8:19 PM PDT

 

 

Station ID:  [id number]

## 2020-09-09 NOTE — ED PHYSICIAN DOCUMENTATION
History of Present Illness





- Stated complaint


Stated Complaint: SYNCOPE, HIT HEAD





- History obtained from


History obtained from: Patient





- Additonal information


Additional information: 


the patient is a 87 y/o insulin dependent diabetic on coumadin for atrial 

fibrillation brought in by ems after she woke up on the floor. the patient is 

c/o of some right sided face swelling and head pain. she denies any cp/sob, neck

pain, abd pain, fevers or dysuria, weakness. denies difficulty speaking or 

unilateral weakness.








Review of Systems


Constitutional: reports: Reviewed and negative


Eyes: reports: Reviewed and negative


Ears: reports: Reviewed and negative


Nose: reports: Reviewed and negative


Throat: reports: Reviewed and negative


Cardiac: reports: Reviewed and negative


Respiratory: reports: Reviewed and negative


GI: reports: Reviewed and negative


: reports: Reviewed and negative


Skin: reports: Reviewed and negative


Musculoskeletal: reports: Reviewed and negative


Neurologic: reports: Head injury


Psychiatric: reports: Reviewed and negative


Endocrine: reports: Reviewed and negative


Immunocompromised: reports: Reviewed and negative





PD PAST MEDICAL HISTORY





- Past Medical History


Cardiovascular: Hypertension, High cholesterol, Atrial fibrillation


Respiratory: None


Endocrine/Autoimmune: Type 2 diabetes


GI: Hiatal hernia


: None


HEENT: None


Psych: None


Musculoskeletal: None


Derm: None





- Past Surgical History


Past Surgical History: Yes


General: Cholecystectomy, Appendectomy


Ortho: Other


/GYN: Hysterectomy


HEENT: Tonsil/Adenoidectomy





- Present Medications


Home Medications: 


                                Ambulatory Orders











 Medication  Instructions  Recorded  Confirmed


 


Ascorbic Acid [Vitamin C] 1,000 mg PO DAILY 10/14/14 12/10/15


 


Cholecalciferol (Vitamin D3) 1,000 unit PO DAILY 10/14/14 11/06/18





[Vitamin D]   


 


Digoxin [Lanoxin] 0.125 mg PO DAILY 10/14/14 11/06/18


 


Lansoprazole 15 mg PO DAILY 10/14/14 11/06/18


 


Lisinopril 10 mg PO DAILY 10/14/14 11/06/18


 


Magnesium Oxide [Magox 400] 400 mg PO DAILY 10/14/14 11/06/18


 


Multivitamin [Multi-Day Vitamins] 1 each PO DAILY 10/14/14 11/06/18


 


Omega-3/Dha/Epa/Fish Oil [Fish Oil] 1,000 mg PO BID 10/14/14 11/06/18


 


Vitamin E 400 unit PO DAILY 10/14/14 11/06/18


 


Warfarin [Coumadin] 7.5 mg PO 1400 10/14/14 11/06/18


 


Glucosam/Chond-Msm1/C/Prem/Bor 1 tab ORAL DAILY 12/10/15 11/06/18





[Glucosa-Chond-MSM Complex Cplt]   


 


Insulin Glargine,Hum.rec.anlog 42 units SQ DAILY 12/10/15 11/06/18





[Lantus Solostar]   


 


Liraglutide [Victoza 2-Sam] 1.8 units SQ DAILY 12/10/15 11/06/18


 


Metoprolol Tartrate 50 mg ORAL Q8HR 12/10/15 11/06/18


 


Pravastatin Sodium 80 mg PO DAILY 12/10/15 11/06/18


 


Cephalexin [Keflex] 500 mg PO Q6H #28 capsule 11/06/18 


 


hydroCHLOROthiazide 0.5 tab ORAL DAILY 11/06/18 11/06/18





[Hydrochlorothiazide]   














- Allergies


Allergies/Adverse Reactions: 


                                    Allergies











Allergy/AdvReac Type Severity Reaction Status Date / Time


 


codeine AdvReac  Nausea Verified 08/15/20 06:32














- Social History


Does the pt smoke?: No


Smoking Status: Never smoker


Does the pt drink ETOH?: Yes


Does the pt have substance abuse?: No





- Immunizations


Immunizations are current?: Yes





- POLST


Patient has POLST: No





PD ED PE NORMAL





- Vitals


Vital signs reviewed: Yes





- General


General: Alert and oriented X 3, No acute distress, Well developed/nourished





- HEENT


HEENT: PERRL, Other (subconjunctival hemorrhage of the right eye, no hyphema, no

 hypopyon, no racoon eyes, no huggins sign, no acute missing teeth)





- Neck


Neck: Supple, no meningeal sign, No bony TTP, Other (no step offs or deformities

 of the cervical spine)





- Cardiac


Cardiac: RRR, No murmur, Strong equal pulses





- Respiratory


Respiratory: Clear bilaterally





- Abdomen


Abdomen: Normal bowel sounds, Soft, Non tender, Non distended





- Back


Back: No spinal TTP





- Derm


Derm: Warm and dry





- Extremities


Extremities: No deformity, No tenderness to palpate, Normal ROM s pain, No 

edema, No calf tenderness / cord





- Neuro


Neuro: Alert and oriented X 3, CNs 2-12 intact, No motor deficit, No sensory 

deficit, Normal speech





- Psych


Psych: Normal mood, Normal affect





Results





- Vitals


Vitals: 


                               Vital Signs - 24 hr











  09/09/20 09/09/20 09/09/20





  19:10 19:34 19:51


 


Temperature 37.3 C  


 


Heart Rate 71 68 69


 


Respiratory 20  17





Rate   


 


Blood Pressure 146/68 H 142/59 H 145/101 H


 


O2 Saturation 95 100 100














  09/09/20 09/09/20 09/09/20





  20:21 20:30 21:00


 


Temperature   


 


Heart Rate 80 70 70


 


Respiratory 19 14 16





Rate   


 


Blood Pressure 166/66 H 145/61 H 153/62 H


 


O2 Saturation 96 100 99














  09/09/20





  21:30


 


Temperature 


 


Heart Rate 73


 


Respiratory 22





Rate 


 


Blood Pressure 162/68 H


 


O2 Saturation 100








                                     Oxygen











O2 Source                      Room air

















- EKG (time done)


  ** 19:30


Rate: Other (no stemi)





- Labs


Labs: 


                                Laboratory Tests











  09/09/20 09/09/20 09/09/20





  19:15 19:15 19:15


 


WBC  5.0  


 


RBC  4.31  


 


Hgb  9.1 L  


 


Hct  32.9 L  


 


MCV  76.3 L  


 


MCH  21.1 L  


 


MCHC  27.7 L  


 


RDW  30.1 H  


 


Plt Count  230  


 


MPV  9.2  


 


Neut # (Auto)  3.7  


 


Lymph # (Auto)  0.7 L  


 


Mono # (Auto)  0.5  


 


Eos # (Auto)  0.1  


 


Baso # (Auto)  0.0  


 


Absolute Nucleated RBC  0.00  


 


Nucleated RBC %  0.0  


 


Manual Slide Review  Indicated  


 


WBC Morphology  NORMAL APPEARANCE  


 


Platelet Estimate  NORMAL (130-450,000)  


 


Platelet Morphology  NORMAL APPEARANCE  


 


RBC Morph Micro Appear  1+ MICROCYTOSIS  


 


PT   18.1 H 


 


INR   1.7 H 


 


APTT   30.3 


 


Sodium    135


 


Potassium    4.0


 


Chloride    101


 


Carbon Dioxide    25


 


Anion Gap    9.0


 


BUN    30 H


 


Creatinine    1.1 H


 


Estimated GFR (MDRD)    47 L


 


Glucose    109 H


 


Lactic Acid   


 


Calcium    8.8


 


Total Bilirubin    0.5


 


AST    24


 


ALT    18


 


Alkaline Phosphatase    59


 


Total Creatine Kinase    100


 


Troponin I High Sens   


 


B-Natriuretic Peptide   


 


Total Protein    6.4 L


 


Albumin    3.7


 


Globulin    2.7


 


Albumin/Globulin Ratio    1.4


 


Lipase    54 H


 


Urine Color   


 


Urine Clarity   


 


Urine pH   


 


Ur Specific Gravity   


 


Urine Protein   


 


Urine Glucose (UA)   


 


Urine Ketones   


 


Urine Occult Blood   


 


Urine Nitrite   


 


Urine Bilirubin   


 


Urine Urobilinogen   


 


Ur Leukocyte Esterase   


 


Urine RBC   


 


Urine WBC   


 


Ur Squamous Epith Cells   


 


Urine Bacteria   


 


Ur Microscopic Review   


 


Urine Culture Comments   














  09/09/20 09/09/20 09/09/20





  19:15 19:15 19:33


 


WBC   


 


RBC   


 


Hgb   


 


Hct   


 


MCV   


 


MCH   


 


MCHC   


 


RDW   


 


Plt Count   


 


MPV   


 


Neut # (Auto)   


 


Lymph # (Auto)   


 


Mono # (Auto)   


 


Eos # (Auto)   


 


Baso # (Auto)   


 


Absolute Nucleated RBC   


 


Nucleated RBC %   


 


Manual Slide Review   


 


WBC Morphology   


 


Platelet Estimate   


 


Platelet Morphology   


 


RBC Morph Micro Appear   


 


PT   


 


INR   


 


APTT   


 


Sodium   


 


Potassium   


 


Chloride   


 


Carbon Dioxide   


 


Anion Gap   


 


BUN   


 


Creatinine   


 


Estimated GFR (MDRD)   


 


Glucose   


 


Lactic Acid    1.1


 


Calcium   


 


Total Bilirubin   


 


AST   


 


ALT   


 


Alkaline Phosphatase   


 


Total Creatine Kinase   


 


Troponin I High Sens  5.6  


 


B-Natriuretic Peptide   62 


 


Total Protein   


 


Albumin   


 


Globulin   


 


Albumin/Globulin Ratio   


 


Lipase   


 


Urine Color   


 


Urine Clarity   


 


Urine pH   


 


Ur Specific Gravity   


 


Urine Protein   


 


Urine Glucose (UA)   


 


Urine Ketones   


 


Urine Occult Blood   


 


Urine Nitrite   


 


Urine Bilirubin   


 


Urine Urobilinogen   


 


Ur Leukocyte Esterase   


 


Urine RBC   


 


Urine WBC   


 


Ur Squamous Epith Cells   


 


Urine Bacteria   


 


Ur Microscopic Review   


 


Urine Culture Comments   














  09/09/20





  20:20


 


WBC 


 


RBC 


 


Hgb 


 


Hct 


 


MCV 


 


MCH 


 


MCHC 


 


RDW 


 


Plt Count 


 


MPV 


 


Neut # (Auto) 


 


Lymph # (Auto) 


 


Mono # (Auto) 


 


Eos # (Auto) 


 


Baso # (Auto) 


 


Absolute Nucleated RBC 


 


Nucleated RBC % 


 


Manual Slide Review 


 


WBC Morphology 


 


Platelet Estimate 


 


Platelet Morphology 


 


RBC Morph Micro Appear 


 


PT 


 


INR 


 


APTT 


 


Sodium 


 


Potassium 


 


Chloride 


 


Carbon Dioxide 


 


Anion Gap 


 


BUN 


 


Creatinine 


 


Estimated GFR (MDRD) 


 


Glucose 


 


Lactic Acid 


 


Calcium 


 


Total Bilirubin 


 


AST 


 


ALT 


 


Alkaline Phosphatase 


 


Total Creatine Kinase 


 


Troponin I High Sens 


 


B-Natriuretic Peptide 


 


Total Protein 


 


Albumin 


 


Globulin 


 


Albumin/Globulin Ratio 


 


Lipase 


 


Urine Color  YELLOW


 


Urine Clarity  CLEAR


 


Urine pH  5.5


 


Ur Specific Gravity  1.025


 


Urine Protein  NEGATIVE


 


Urine Glucose (UA)  NEGATIVE


 


Urine Ketones  TRACE


 


Urine Occult Blood  NEGATIVE


 


Urine Nitrite  NEGATIVE


 


Urine Bilirubin  NEGATIVE


 


Urine Urobilinogen  0.2 (NORMAL)


 


Ur Leukocyte Esterase  SMALL H


 


Urine RBC  None Seen


 


Urine WBC  4-5


 


Ur Squamous Epith Cells  FEW Squamous


 


Urine Bacteria  None Seen


 


Ur Microscopic Review  INDICATED


 


Urine Culture Comments  INDICATED














PD MEDICAL DECISION MAKING





- ED course


Complexity details: reviewed results, re-evaluated patient, considered 

differential (SAH, EDH, SDH, IPH, syncope, hypoglycemia, uti, skull fracture, 

facial fracture, subconjunctival hemorrhage.), d/w patient, d/w family





- Consults


Consults: Consulted (name) (dr. abhinav presley, trauma attending has 

accepted this patient, recommends [facility] and send via life flight. )





Departure





- Departure


Disposition: 02 Transfer Acute Care Hosp


Clinical Impression: 


 Subconjunctival hemorrhage of right eye, Intraparenchymal hemorrhage of brain





Condition: Stable


Discharge Date/Time: 09/09/20 22:04

## 2020-09-09 NOTE — CT REPORT
PROCEDURE:  CERVICAL SPINE WO

 

INDICATIONS:  fall neck pain

 

TECHNIQUE:  

Noncontrast 3 mm thick sections acquired from the skull base to the T4 level.  Sagittal and coronal r
eformats were then constructed.  For radiation dose reduction, the following was used:  automated exp
osure control, adjustment of mA and/or kV according to patient size.

 

COMPARISON:  None.

 

FINDINGS:  

Image quality:  Excellent.  

 

Bones:  No acute fractures or dislocations.  Severe multilevel cervical spondylitic changes throughou
t the imaged spine but most pronounced from C6-7 through T1-T2. No acute compression fractures. Strai
ghtening of normal cervical lordosis likely related to patient positioning and/or concurrent muscle s
pasms. C1-C2 relationship is preserved. Craniocervical junction is intact. Moderate multilevel facet 
arthropathy. Visualized superior ribs are intact.  

 

Soft tissues:  Prevertebral soft tissues are normal in thickness.  No paravertebral hematomas.  No ap
ical pneumothoraces.  

 

IMPRESSION:  

1. CT cervical spine without acute fracture or dislocation.

2. Severe multilevel cervical spondylosis.

3. Straightening of cervical lordosis likely related to patient positioning and/or concurrent muscle 
spasms.

 

Findings were discussed with  of the emergency Department staff at 2017 hours.

 

Reviewed by: Tico Granado MD on 9/9/2020 8:23 PM PDT

Approved by: Tico Granado MD on 9/9/2020 8:23 PM PDT

 

 

Station ID:  [id number]

## 2020-09-09 NOTE — XRAY REPORT
PROCEDURE:  Chest 1 View X-Ray

 

INDICATIONS:  sob

 

TECHNIQUE:  One view of the chest was acquired.  

 

COMPARISON:  8/15/2020

 

FINDINGS:  

 

Surgical changes and devices:  None.  

 

Lungs and pleura:  No pleural effusions or pneumothorax.  Lungs are clear.  

 

Mediastinum:  Mediastinal contours appear normal.  Heart size is normal.  

 

Bones and chest wall:  No suspicious bony lesions.  Overlying soft tissues appear unremarkable.  

 

IMPRESSION:  

Chest without acute cardiopulmonary abnormalities.

 

Reviewed by: Tico Granado MD on 9/9/2020 8:24 PM PDT

Approved by: Tico Granado MD on 9/9/2020 8:24 PM PDT

 

 

Station ID:  [id number]

## 2020-12-16 ENCOUNTER — HOSPITAL ENCOUNTER (OUTPATIENT)
Dept: HOSPITAL 76 - DI | Age: 85
Discharge: HOME | End: 2020-12-16
Attending: FAMILY MEDICINE
Payer: MEDICARE

## 2020-12-16 DIAGNOSIS — M79.602: ICD-10-CM

## 2020-12-16 DIAGNOSIS — I62.9: Primary | ICD-10-CM

## 2020-12-16 DIAGNOSIS — R20.0: ICD-10-CM

## 2020-12-16 PROCEDURE — 70450 CT HEAD/BRAIN W/O DYE: CPT

## 2020-12-16 NOTE — CT REPORT
PROCEDURE:  HEAD WO

 

INDICATIONS:  INTRACRANIAL HEMORRHAGE

 

TECHNIQUE:  

Noncontrast 4.5 mm thick angled axial sections acquired from the foramen magnum to the vertex.  For r
adiation dose reduction, the following was used:  automated exposure control, adjustment of mA and/or
 kV according to patient size.

 

COMPARISON:  CT head 9/9/2020

 

FINDINGS:  

Image quality:  Excellent.  

 

The ventricular system and cortical sulci demonstrate atrophy, consistent for patient's stated age.  
There are areas of hypodensity in the periventricular and subcortical white matter. Previous hyperden
sity within the lower left temporal lobe has resolved. However, a new hyperdensity within the superio
r right frontal lobe is present measuring 1.4 cm x 1.6 cm x 1.5 cm. There is no midline shift. Minima
l surrounding edema is noted. Brainstem is unremarkable.  Globes are symmetrical. Sinuses are aerated
. Osseous structures are intact. 

 

 IMPRESSION:  

 

 

1. Foci of hyperdensity within the right frontal lobe consistent with hemorrhage. Minimal surrounding
 edema without midline shift is noted. Recommend correlation to recent trauma. It is noted a similar-
appearing focus was present in the left temporal lobe on 9/9/2020. While these could be related to tr
auma, hemorrhagic ischemia or possibly anticoagulation, recommend correlation to any known neoplastic
 disease, as hemorrhagic foci cannot be definitively excluded. Further evaluation with MRI with and w
ithout contrast is recommended as indicated.

 

The above findings were discussed with Dr. Michael Hester on 12/15/2020 at 4:55 PM.

 

Reviewed by: Ally Chew MD on 12/16/2020 5:01 PM PST

Approved by: Ally Chew MD on 12/16/2020 5:01 PM PST

 

 

Station ID:  [id number]

## 2020-12-16 NOTE — ULTRASOUND REPORT
PROCEDURE:  Duplex Upr Ext Arterial LT

 

INDICATIONS:  LEFT ARM PAIN, NUMBNESS

 

TECHNIQUE:  

Color and pulse Doppler interrogation was performed of left upper extremity arterial systems, with im
age documentation.  

 

COMPARISON:  None.

 

FINDINGS:  

Subclavian artery (mid):  117 cm/sec, with triphasic flow.  

Axillary artery:  95 cm/sec, with triphasic flow.  

Brachial artery (proximal):  115 cm/sec, with triphasic flow.  

Radial artery (proximal):  95 cm/sec, with triphasic flow.  

Radial artery (mid):  104 cm/sec, with triphasic flow.  

Radial artery (distal):  132 cm/sec, with triphasic flow.  

Ulnar artery (proximal):  93 cm/sec, with triphasic flow.  

Ulnar artery (mid):  78 cm/sec. with triphasic flow.  

Ulnar artery (distal):  113 cm/sec, with triphasic flow.  

Gray-scale imaging description:  No visualized plaque. 

 

IMPRESSION:  

No hemodynamically significant stenosis.

 

Reviewed by: Ally Chew MD on 12/16/2020 5:21 PM PST

Approved by: Ally Chew MD on 12/16/2020 5:21 PM PST

 

 

Station ID:  [id number]

## 2021-02-12 ENCOUNTER — HOSPITAL ENCOUNTER (EMERGENCY)
Dept: HOSPITAL 76 - ED | Age: 86
Discharge: HOME | End: 2021-02-12
Payer: MEDICARE

## 2021-02-12 ENCOUNTER — HOSPITAL ENCOUNTER (OUTPATIENT)
Dept: HOSPITAL 76 - EMS | Age: 86
Discharge: ANOTHER HOSPITAL | End: 2021-02-12
Attending: EMERGENCY MEDICINE
Payer: MEDICARE

## 2021-02-12 VITALS — DIASTOLIC BLOOD PRESSURE: 70 MMHG | SYSTOLIC BLOOD PRESSURE: 173 MMHG

## 2021-02-12 DIAGNOSIS — R26.81: ICD-10-CM

## 2021-02-12 DIAGNOSIS — R51.9: ICD-10-CM

## 2021-02-12 DIAGNOSIS — R53.1: Primary | ICD-10-CM

## 2021-02-12 DIAGNOSIS — Z79.4: ICD-10-CM

## 2021-02-12 DIAGNOSIS — R29.810: ICD-10-CM

## 2021-02-12 DIAGNOSIS — Z86.79: ICD-10-CM

## 2021-02-12 DIAGNOSIS — E11.9: ICD-10-CM

## 2021-02-12 DIAGNOSIS — I10: ICD-10-CM

## 2021-02-12 LAB
ALBUMIN DIAFP-MCNC: 3.8 G/DL (ref 3.2–5.5)
ALBUMIN/GLOB SERPL: 1.4 {RATIO} (ref 1–2.2)
ALP SERPL-CCNC: 74 IU/L (ref 42–121)
ALT SERPL W P-5'-P-CCNC: 19 IU/L (ref 10–60)
ANION GAP SERPL CALCULATED.4IONS-SCNC: 12 MMOL/L (ref 6–13)
AST SERPL W P-5'-P-CCNC: 24 IU/L (ref 10–42)
BASOPHILS NFR BLD AUTO: 0 10^3/UL (ref 0–0.1)
BASOPHILS NFR BLD AUTO: 0.4 %
BILIRUB BLD-MCNC: 0.8 MG/DL (ref 0.2–1)
BUN SERPL-MCNC: 28 MG/DL (ref 6–20)
CALCIUM UR-MCNC: 8.9 MG/DL (ref 8.5–10.3)
CHLORIDE SERPL-SCNC: 99 MMOL/L (ref 101–111)
CO2 SERPL-SCNC: 28 MMOL/L (ref 21–32)
CREAT SERPLBLD-SCNC: 0.8 MG/DL (ref 0.4–1)
EOSINOPHIL # BLD AUTO: 0.1 10^3/UL (ref 0–0.7)
EOSINOPHIL NFR BLD AUTO: 2.1 %
ERYTHROCYTE [DISTWIDTH] IN BLOOD BY AUTOMATED COUNT: 14.6 % (ref 12–15)
GLOBULIN SER-MCNC: 2.7 G/DL (ref 2.1–4.2)
GLUCOSE SERPL-MCNC: 119 MG/DL (ref 70–100)
HGB UR QL STRIP: 13.5 G/DL (ref 12–16)
LIPASE SERPL-CCNC: 34 U/L (ref 22–51)
LYMPHOCYTES # SPEC AUTO: 0.9 10^3/UL (ref 1.5–3.5)
LYMPHOCYTES NFR BLD AUTO: 19.5 %
MCH RBC QN AUTO: 28.8 PG (ref 27–31)
MCHC RBC AUTO-ENTMCNC: 32.1 G/DL (ref 32–36)
MCV RBC AUTO: 89.6 FL (ref 81–99)
MONOCYTES # BLD AUTO: 0.5 10^3/UL (ref 0–1)
MONOCYTES NFR BLD AUTO: 10.8 %
NEUTROPHILS # BLD AUTO: 3.2 10^3/UL (ref 1.5–6.6)
NEUTROPHILS # SNV AUTO: 4.7 X10^3/UL (ref 4.8–10.8)
NEUTROPHILS NFR BLD AUTO: 67 %
PDW BLD AUTO: 9 FL (ref 7.9–10.8)
PLATELET # BLD: 182 10^3/UL (ref 130–450)
PROT SPEC-MCNC: 6.5 G/DL (ref 6.7–8.2)
RBC MAR: 4.69 10^6/UL (ref 4.2–5.4)

## 2021-02-12 PROCEDURE — 85025 COMPLETE CBC W/AUTO DIFF WBC: CPT

## 2021-02-12 PROCEDURE — 36415 COLL VENOUS BLD VENIPUNCTURE: CPT

## 2021-02-12 PROCEDURE — 83690 ASSAY OF LIPASE: CPT

## 2021-02-12 PROCEDURE — 99284 EMERGENCY DEPT VISIT MOD MDM: CPT

## 2021-02-12 PROCEDURE — 80053 COMPREHEN METABOLIC PANEL: CPT

## 2021-02-12 PROCEDURE — 99281 EMR DPT VST MAYX REQ PHY/QHP: CPT

## 2021-02-12 PROCEDURE — 93005 ELECTROCARDIOGRAM TRACING: CPT

## 2021-02-12 PROCEDURE — 71045 X-RAY EXAM CHEST 1 VIEW: CPT

## 2021-02-12 PROCEDURE — 70450 CT HEAD/BRAIN W/O DYE: CPT

## 2021-02-12 PROCEDURE — 84484 ASSAY OF TROPONIN QUANT: CPT

## 2021-02-12 NOTE — CT REPORT
PROCEDURE:  HEAD WO

 

INDICATIONS:  Assessment for intracranial hemorrhage or stroke; neurologic and signs and symptoms of 
stroke; facial droop, unsteadiness

 

TECHNIQUE:  

Noncontrast 4.5 mm thick angled axial sections acquired from the foramen magnum to the vertex.  For r
adiation dose reduction, the following was used:  automated exposure control, adjustment of mA and/or
 kV according to patient size.

 

COMPARISON:  None.

 

FINDINGS:  

Image quality:  Excellent.  

 

CSF spaces:  Basal cisterns are patent.  No extra-axial fluid collections.  Ventricles are normal in 
size and shape.  

 

Brain:  No midline shift.  No intracranial masses or hemorrhage.  Gray-white matter interface is main
tained.  

 

Skull and face:  Calvarium and visualized facial bones are intact, without suspicious lesions.  

 

Sinuses:  Visualized sinuses and mastoids are clear.  

 

IMPRESSION:  No acute intracranial abnormality

 

Reviewed by: Nishant Kim MD on 2/12/2021 9:07 AM [facility]

Approved by: Nishant Kim MD on 2/12/2021 9:07 AM [facility]

 

 

Station ID:  [id number]

## 2021-02-12 NOTE — ED PHYSICIAN DOCUMENTATION
History of Present Illness





- Stated complaint


Stated Complaint: L side facial droop





- Chief complaint


Chief Complaint: Neuro





- History obtained from


History obtained from: Patient, Family (daughter), EMS





- Additonal information


Additional information: 





87yF with pmh afib not on AC since her september ICH, also with repeat brain 

bleed in December, p/w mild frontal headache last night around 11pm that self 

resolved, f/by jitteriness on waking this am, sensation of weakness, and 

possible mild L facial droop noted by her daughter. They report she had an 

active day yesterday with her first PT/OT session but otherwise was feeling 

normal. on arrival to ED patient has no complaints, no neuro deficits. 





Review of Systems


Ten Systems: 10 systems reviewed and negative


Neurologic: reports: Generalized weakness, Other (possible facial droop)





PD PAST MEDICAL HISTORY





- Past Medical History


Cardiovascular: Hypertension, High cholesterol, Atrial fibrillation


Respiratory: None


Endocrine/Autoimmune: Type 2 diabetes


GI: Hiatal hernia


: None


HEENT: None


Psych: None


Musculoskeletal: None


Derm: None





- Past Surgical History


Past Surgical History: Yes


General: Cholecystectomy, Appendectomy


Ortho: Other


/GYN: Hysterectomy


HEENT: Tonsil/Adenoidectomy





- Present Medications


Home Medications: 


                                Ambulatory Orders











 Medication  Instructions  Recorded  Confirmed


 


Ascorbic Acid [Vitamin C] 1,000 mg PO DAILY 10/14/14 02/12/21


 


Cholecalciferol (Vitamin D3) 1,000 unit PO DAILY 10/14/14 02/12/21





[Vitamin D]   


 


Digoxin [Lanoxin] 0.125 mg PO DAILY 10/14/14 02/12/21


 


Lansoprazole 15 mg PO DAILY 10/14/14 02/12/21


 


Lisinopril 10 mg PO DAILY 10/14/14 02/12/21


 


Magnesium Oxide [Magox 400] 400 mg PO DAILY 10/14/14 02/12/21


 


Multivitamin [Multi-Day Vitamins] 1 each PO DAILY 10/14/14 02/12/21


 


Omega-3/Dha/Epa/Fish Oil [Fish Oil] 1,000 mg PO BID 10/14/14 02/12/21


 


Vitamin E 400 unit PO DAILY 10/14/14 02/12/21


 


Glucosam/Chond-Msm1/C/Prem/Bor 1 tab ORAL DAILY 12/10/15 02/12/21





[Glucosa-Chond-MSM Complex Cplt]   


 


Insulin Glargine,Hum.rec.anlog 42 units SQ DAILY 12/10/15 02/12/21





[Lantus Solostar]   


 


Liraglutide [Victoza 2-Sam] 1.8 units SQ DAILY 12/10/15 02/12/21


 


Metoprolol Tartrate 50 mg ORAL Q8HR 12/10/15 02/12/21


 


Pravastatin Sodium 80 mg PO DAILY 12/10/15 02/12/21


 


hydroCHLOROthiazide 0.5 tab ORAL DAILY 11/06/18 02/12/21





[Hydrochlorothiazide]   


 


amLODIPine [Norvasc] 10 mg PO DAILY 02/12/21 02/12/21


 


rOPINIRole [Requip] 0.25 mg PO QPM 02/12/21 02/12/21














- Allergies


Allergies/Adverse Reactions: 


                                    Allergies











Allergy/AdvReac Type Severity Reaction Status Date / Time


 


codeine AdvReac  Nausea Verified 02/12/21 09:27














- Social History


Does the pt smoke?: No


Smoking Status: Never smoker


Does the pt drink ETOH?: Yes


Does the pt have substance abuse?: No





- Immunizations


Immunizations are current?: Yes





- POLST


Patient has POLST: No





PD ED PE NORMAL





- Vitals


Vital signs reviewed: Yes





- General


General: Alert and oriented X 3, No acute distress, Well developed/nourished





- HEENT


HEENT: Atraumatic, PERRL, EOMI





- Neck


Neck: Supple, no meningeal sign





- Cardiac


Cardiac: RRR





- Respiratory


Respiratory: No respiratory distress





- Abdomen


Abdomen: Normal bowel sounds, Non tender, Non distended





- Female 


Female : Deferred





- Rectal


Rectal: Deferred





- Back


Back: No spinal TTP





- Derm


Derm: Normal color





- Extremities


Extremities: No deformity





- Neuro


Neuro: Alert and oriented X 3, CNs 2-12 intact, No motor deficit, No sensory 

deficit, Other (ambulatory with assistance)





- Psych


Psych: Normal mood, Normal affect





Results





- Vitals


Vitals: 


                               Vital Signs - 24 hr











  02/12/21 02/12/21 02/12/21





  09:22 09:37 11:43


 


Temperature 36.5 C  


 


Heart Rate 66 66 71


 


Respiratory 20 23 14





Rate   


 


Blood Pressure 157/83 H 157/83 H 173/70 H


 


O2 Saturation 100 100 100








                                     Oxygen











O2 Source                      Room air

















- Labs


Labs: 


                                Laboratory Tests











  02/12/21 02/12/21 02/12/21





  09:41 09:41 09:41


 


WBC  4.7 L  


 


RBC  4.69  


 


Hgb  13.5  


 


Hct  42.0  


 


MCV  89.6  


 


MCH  28.8  


 


MCHC  32.1  


 


RDW  14.6  


 


Plt Count  182  


 


MPV  9.0  


 


Neut # (Auto)  3.2  


 


Lymph # (Auto)  0.9 L  


 


Mono # (Auto)  0.5  


 


Eos # (Auto)  0.1  


 


Baso # (Auto)  0.0  


 


Absolute Nucleated RBC  0.00  


 


Nucleated RBC %  0.0  


 


Sodium   139 


 


Potassium   3.9 


 


Chloride   99 L 


 


Carbon Dioxide   28 


 


Anion Gap   12.0 


 


BUN   28 H 


 


Creatinine   0.8 


 


Estimated GFR (MDRD)   68 L 


 


Glucose   119 H 


 


Calcium   8.9 


 


Total Bilirubin   0.8 


 


AST   24 


 


ALT   19 


 


Alkaline Phosphatase   74 


 


Troponin I High Sens    6.1


 


Total Protein   6.5 L 


 


Albumin   3.8 


 


Globulin   2.7 


 


Albumin/Globulin Ratio   1.4 


 


Lipase   34 














PD MEDICAL DECISION MAKING





- ED course


ED course: 


9:30am - 


87-year-old woman with history of multiple brain bleeds, most recent in 

September, presents with brief headache lasting 10 minutes and self resolving 

last night around 11 PM prior to going to bed.  Upon waking she was jittery and 

unbalanced, and her daughter thought that she noted a left facial droop.  On 

arrival to the emergency room patient states that she feels better, however is 

having difficulty ambulating and is a little bit more unsteady on her feet than 

yesterday.  Neurologic exam otherwise within normal limits. NIHSS 0. No facial 

droop noted on examination.  Will obtain head CT and lab work to evaluate for 

neurologic etiology. not a tpa or thrombectomy candidate given hx of bleeds, 

resolving symptoms, low nihss.








Patient asymptomatic at present. labwork, cxr, ekg, ct head noncontributory. she

 is ambulatory without difficulty. she thinks she may have overextended herself 

at PT yesterday so we discussed taking it easy at next session. d/w daughter who

 will set her up with primary doc follow up appointment. strict return 

precautions discussed. 





Departure





- Departure


Disposition: 01 Home, Self Care


Clinical Impression: 


 Weakness, Feeling jittery, Facial droop





Condition: Good


Instructions:  ED Weakness UKO


Comments: 


You were seen in the emergency department for weakness and possible facial droop

 after having a headache yesterday. Since your symptoms have resolved and your 

CT head, ekg, chest xray and labwork were okay, you can follow up with your 

primary doctor. Make sure to ease into your next physical therapy session and 

don't overextend yourself. Return to the ed if you have any new or worsening 

symptoms or other concerns.
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
## 2022-01-09 ENCOUNTER — HOSPITAL ENCOUNTER (OUTPATIENT)
Dept: HOSPITAL 76 - EMS | Age: 87
Discharge: ANOTHER HOSPITAL | End: 2022-01-09
Payer: MEDICARE

## 2022-01-09 DIAGNOSIS — G89.29: ICD-10-CM

## 2022-01-09 DIAGNOSIS — Z04.3: Primary | ICD-10-CM

## 2022-01-09 DIAGNOSIS — M25.562: ICD-10-CM

## 2022-01-09 DIAGNOSIS — R29.898: ICD-10-CM

## 2022-01-15 ENCOUNTER — HOSPITAL ENCOUNTER (INPATIENT)
Dept: HOSPITAL 76 - ED | Age: 87
LOS: 2 days | Discharge: HOME HEALTH | DRG: 639 | End: 2022-01-17
Attending: INTERNAL MEDICINE | Admitting: NURSE PRACTITIONER
Payer: MEDICARE

## 2022-01-15 ENCOUNTER — HOSPITAL ENCOUNTER (OUTPATIENT)
Dept: HOSPITAL 76 - EMS | Age: 87
Discharge: ANOTHER HOSPITAL | End: 2022-01-15
Payer: MEDICARE

## 2022-01-15 DIAGNOSIS — I10: ICD-10-CM

## 2022-01-15 DIAGNOSIS — Z20.822: ICD-10-CM

## 2022-01-15 DIAGNOSIS — S00.81XA: ICD-10-CM

## 2022-01-15 DIAGNOSIS — R41.0: Primary | ICD-10-CM

## 2022-01-15 DIAGNOSIS — R19.5: ICD-10-CM

## 2022-01-15 DIAGNOSIS — E11.649: Primary | ICD-10-CM

## 2022-01-15 DIAGNOSIS — S80.211A: ICD-10-CM

## 2022-01-15 DIAGNOSIS — T68.XXXA: ICD-10-CM

## 2022-01-15 DIAGNOSIS — Z91.81: ICD-10-CM

## 2022-01-15 DIAGNOSIS — G40.909: ICD-10-CM

## 2022-01-15 DIAGNOSIS — Z86.73: ICD-10-CM

## 2022-01-15 DIAGNOSIS — E16.2: ICD-10-CM
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
## 2022-01-15 DIAGNOSIS — R41.0: ICD-10-CM

## 2022-01-15 DIAGNOSIS — I48.91: ICD-10-CM

## 2022-01-15 DIAGNOSIS — Z79.899: ICD-10-CM

## 2022-01-15 DIAGNOSIS — Z79.4: ICD-10-CM
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
## 2022-01-15 DIAGNOSIS — E78.5: ICD-10-CM

## 2022-01-15 DIAGNOSIS — E78.00: ICD-10-CM

## 2022-01-15 LAB
ALBUMIN DIAFP-MCNC: 4 G/DL (ref 3.2–5.5)
ALBUMIN/GLOB SERPL: 1.4 {RATIO} (ref 1–2.2)
ALP SERPL-CCNC: 66 IU/L (ref 42–121)
ALT SERPL W P-5'-P-CCNC: 20 IU/L (ref 10–60)
ANION GAP SERPL CALCULATED.4IONS-SCNC: 11 MMOL/L (ref 6–13)
AST SERPL W P-5'-P-CCNC: 24 IU/L (ref 10–42)
B PARAPERT DNA SPEC QL NAA+PROBE: NOT DETECTED
B PERT DNA SPEC QL NAA+PROBE: NOT DETECTED
BASOPHILS NFR BLD AUTO: 0 10^3/UL (ref 0–0.1)
BASOPHILS NFR BLD AUTO: 0.2 %
BILIRUB BLD-MCNC: 0.5 MG/DL (ref 0.2–1)
BUN SERPL-MCNC: 26 MG/DL (ref 6–20)
C PNEUM DNA NPH QL NAA+NON-PROBE: NOT DETECTED
CALCIUM UR-MCNC: 8.9 MG/DL (ref 8.5–10.3)
CHLORIDE SERPL-SCNC: 102 MMOL/L (ref 101–111)
CK SERPL-CCNC: 147 IU/L (ref 22–269)
CLARITY UR REFRACT.AUTO: CLEAR
CO2 SERPL-SCNC: 25 MMOL/L (ref 21–32)
CREAT SERPLBLD-SCNC: 0.6 MG/DL (ref 0.4–1)
DIGOXIN SERPL-MCNC: 0.4 NG/ML
EOSINOPHIL # BLD AUTO: 0.1 10^3/UL (ref 0–0.7)
EOSINOPHIL NFR BLD AUTO: 1 %
ERYTHROCYTE [DISTWIDTH] IN BLOOD BY AUTOMATED COUNT: 12.4 % (ref 12–15)
EST. AVERAGE GLUCOSE BLD GHB EST-MCNC: 163 MG/DL (ref 70–100)
FLUAV RNA RESP QL NAA+PROBE: NOT DETECTED
GFRSERPLBLD MDRD-ARVRAT: 94 ML/MIN/{1.73_M2} (ref 89–?)
GLOBULIN SER-MCNC: 2.9 G/DL (ref 2.1–4.2)
GLUCOSE SERPL-MCNC: 105 MG/DL (ref 70–100)
GLUCOSE UR QL STRIP.AUTO: NEGATIVE MG/DL
HAEM INFLU B DNA SPEC QL NAA+PROBE: NOT DETECTED
HBA1C MFR BLD HPLC: 7.3 % (ref 4.27–6.07)
HCOV 229E RNA SPEC QL NAA+PROBE: NOT DETECTED
HCOV HKU1 RNA UPPER RESP QL NAA+PROBE: NOT DETECTED
HCOV NL63 RNA ASPIRATE QL NAA+PROBE: NOT DETECTED
HCOV OC43 RNA SPEC QL NAA+PROBE: NOT DETECTED
HCT VFR BLD AUTO: 46.1 % (ref 37–47)
HGB UR QL STRIP: 15.1 G/DL (ref 12–16)
HMPV AG SPEC QL: NOT DETECTED
HPIV1 RNA NPH QL NAA+PROBE: NOT DETECTED
HPIV2 SPEC QL CULT: NOT DETECTED
HPIV3 AB TITR SER CF: NOT DETECTED {TITER}
HPIV4 RNA SPEC QL NAA+PROBE: NOT DETECTED
KETONES UR QL STRIP.AUTO: NEGATIVE MG/DL
LIPASE SERPL-CCNC: 42 U/L (ref 22–51)
LYMPHOCYTES # SPEC AUTO: 0.8 10^3/UL (ref 1.5–3.5)
LYMPHOCYTES NFR BLD AUTO: 14.9 %
M PNEUMO DNA SPEC QL NAA+PROBE: NOT DETECTED
MCH RBC QN AUTO: 29.3 PG (ref 27–31)
MCHC RBC AUTO-ENTMCNC: 32.8 G/DL (ref 32–36)
MCV RBC AUTO: 89.3 FL (ref 81–99)
MONOCYTES # BLD AUTO: 0.3 10^3/UL (ref 0–1)
MONOCYTES NFR BLD AUTO: 6.2 %
NEUTROPHILS # BLD AUTO: 3.9 10^3/UL (ref 1.5–6.6)
NEUTROPHILS # SNV AUTO: 5 X10^3/UL (ref 4.8–10.8)
NEUTROPHILS NFR BLD AUTO: 77.5 %
NITRITE UR QL STRIP.AUTO: NEGATIVE
NRBC # BLD AUTO: 0 /100WBC
NRBC # BLD AUTO: 0 X10^3/UL
PDW BLD AUTO: 10.3 FL (ref 7.9–10.8)
PH UR STRIP.AUTO: 5.5 PH (ref 5–7.5)
PLATELET # BLD: 136 10^3/UL (ref 130–450)
POTASSIUM SERPL-SCNC: 3.8 MMOL/L (ref 3.5–5)
PROT SPEC-MCNC: 6.9 G/DL (ref 6.7–8.2)
PROT UR STRIP.AUTO-MCNC: NEGATIVE MG/DL
RBC # UR STRIP.AUTO: NEGATIVE /UL
RBC MAR: 5.16 10^6/UL (ref 4.2–5.4)
RSV RNA RESP QL NAA+PROBE: NOT DETECTED
RV+EV RNA SPEC QL NAA+PROBE: NOT DETECTED
SARS-COV-2 RNA PNL SPEC NAA+PROBE: NOT DETECTED
SODIUM SERPLBLD-SCNC: 138 MMOL/L (ref 135–145)
SP GR UR STRIP.AUTO: 1.02 (ref 1–1.03)
SQUAMOUS URNS QL MICRO: (no result)
T4 FREE SERPL-MCNC: 0.87 NG/DL (ref 0.58–1.64)
T4 SERPL-MCNC: 8.47 UG/DL (ref 6.09–12.23)
TSH SERPL-ACNC: 1.68 UIU/ML (ref 0.34–5.6)
UROBILINOGEN UR QL STRIP.AUTO: (no result) E.U./DL
UROBILINOGEN UR STRIP.AUTO-MCNC: NEGATIVE MG/DL
WBC # UR MANUAL: (no result) /HPF (ref 0–5)

## 2022-01-15 PROCEDURE — 82607 VITAMIN B-12: CPT

## 2022-01-15 PROCEDURE — 84466 ASSAY OF TRANSFERRIN: CPT

## 2022-01-15 PROCEDURE — 87086 URINE CULTURE/COLONY COUNT: CPT

## 2022-01-15 PROCEDURE — 85014 HEMATOCRIT: CPT

## 2022-01-15 PROCEDURE — 87040 BLOOD CULTURE FOR BACTERIA: CPT

## 2022-01-15 PROCEDURE — 84443 ASSAY THYROID STIM HORMONE: CPT

## 2022-01-15 PROCEDURE — 93005 ELECTROCARDIOGRAM TRACING: CPT

## 2022-01-15 PROCEDURE — 73560 X-RAY EXAM OF KNEE 1 OR 2: CPT

## 2022-01-15 PROCEDURE — 85018 HEMOGLOBIN: CPT

## 2022-01-15 PROCEDURE — 83036 HEMOGLOBIN GLYCOSYLATED A1C: CPT

## 2022-01-15 PROCEDURE — 83605 ASSAY OF LACTIC ACID: CPT

## 2022-01-15 PROCEDURE — 84484 ASSAY OF TROPONIN QUANT: CPT

## 2022-01-15 PROCEDURE — 84436 ASSAY OF TOTAL THYROXINE: CPT

## 2022-01-15 PROCEDURE — 87631 RESP VIRUS 3-5 TARGETS: CPT

## 2022-01-15 PROCEDURE — 82550 ASSAY OF CK (CPK): CPT

## 2022-01-15 PROCEDURE — 51702 INSERT TEMP BLADDER CATH: CPT

## 2022-01-15 PROCEDURE — 85025 COMPLETE CBC W/AUTO DIFF WBC: CPT

## 2022-01-15 PROCEDURE — 83615 LACTATE (LD) (LDH) ENZYME: CPT

## 2022-01-15 PROCEDURE — 80162 ASSAY OF DIGOXIN TOTAL: CPT

## 2022-01-15 PROCEDURE — 85045 AUTOMATED RETICULOCYTE COUNT: CPT

## 2022-01-15 PROCEDURE — 82274 ASSAY TEST FOR BLOOD FECAL: CPT

## 2022-01-15 PROCEDURE — 82728 ASSAY OF FERRITIN: CPT

## 2022-01-15 PROCEDURE — 96361 HYDRATE IV INFUSION ADD-ON: CPT

## 2022-01-15 PROCEDURE — 80048 BASIC METABOLIC PNL TOTAL CA: CPT

## 2022-01-15 PROCEDURE — 0202U NFCT DS 22 TRGT SARS-COV-2: CPT

## 2022-01-15 PROCEDURE — 96374 THER/PROPH/DIAG INJ IV PUSH: CPT

## 2022-01-15 PROCEDURE — 84439 ASSAY OF FREE THYROXINE: CPT

## 2022-01-15 PROCEDURE — 83540 ASSAY OF IRON: CPT

## 2022-01-15 PROCEDURE — 83690 ASSAY OF LIPASE: CPT

## 2022-01-15 PROCEDURE — 70450 CT HEAD/BRAIN W/O DYE: CPT

## 2022-01-15 PROCEDURE — 80053 COMPREHEN METABOLIC PANEL: CPT

## 2022-01-15 PROCEDURE — 71045 X-RAY EXAM CHEST 1 VIEW: CPT

## 2022-01-15 PROCEDURE — 36415 COLL VENOUS BLD VENIPUNCTURE: CPT

## 2022-01-15 PROCEDURE — 96372 THER/PROPH/DIAG INJ SC/IM: CPT

## 2022-01-15 PROCEDURE — 99285 EMERGENCY DEPT VISIT HI MDM: CPT

## 2022-01-15 PROCEDURE — 81001 URINALYSIS AUTO W/SCOPE: CPT

## 2022-01-15 PROCEDURE — 80177 DRUG SCRN QUAN LEVETIRACETAM: CPT

## 2022-01-15 RX ADMIN — INSULIN ASPART SCH UNIT: 100 INJECTION, SOLUTION INTRAVENOUS; SUBCUTANEOUS at 11:53

## 2022-01-15 RX ADMIN — SODIUM CHLORIDE, PRESERVATIVE FREE SCH ML: 5 INJECTION INTRAVENOUS at 09:26

## 2022-01-15 RX ADMIN — PRAVASTATIN SODIUM SCH MG: 40 TABLET ORAL at 20:23

## 2022-01-15 RX ADMIN — INSULIN GLARGINE SCH UNIT: 100 INJECTION, SOLUTION SUBCUTANEOUS at 20:23

## 2022-01-15 RX ADMIN — ENOXAPARIN SODIUM SCH MG: 100 INJECTION SUBCUTANEOUS at 09:25

## 2022-01-15 RX ADMIN — INSULIN ASPART SCH: 100 INJECTION, SOLUTION INTRAVENOUS; SUBCUTANEOUS at 18:36

## 2022-01-15 RX ADMIN — Medication SCH MG: at 09:26

## 2022-01-15 RX ADMIN — INSULIN ASPART SCH UNIT: 100 INJECTION, SOLUTION INTRAVENOUS; SUBCUTANEOUS at 09:25

## 2022-01-15 RX ADMIN — DIGOXIN SCH MCG: 125 TABLET ORAL at 09:25

## 2022-01-15 RX ADMIN — SODIUM CHLORIDE, PRESERVATIVE FREE SCH ML: 5 INJECTION INTRAVENOUS at 17:16

## 2022-01-15 RX ADMIN — INSULIN ASPART SCH UNIT: 100 INJECTION, SOLUTION INTRAVENOUS; SUBCUTANEOUS at 20:20

## 2022-01-15 NOTE — CT REPORT
PROCEDURE:  HEAD WO

 

INDICATIONS:  fall from bed, unknown loc

 

TECHNIQUE:  

Noncontrast 4.5 mm thick angled axial sections acquired from the foramen magnum to the vertex.  For r
adiation dose reduction, the following was used:  automated exposure control, adjustment of mA and/or
 kV according to patient size.

 

COMPARISON:  2/12/2021, 12/16/2020

 

FINDINGS:  

Image quality: Beam hardening artifact can be seen within the periphery of the brain.

 

CSF spaces:  Basal cisterns are patent.  No extra-axial fluid collections.  Ventricles are normal in 
size and shape.  

 

Brain:  No midline shift.  No intracranial masses or hemorrhage.  Gray-white matter interface is norm
al.  Age-appropriate brain parenchymal volume loss and chronic small vessel ischemic change can be se
en.  

 

Skull and face:  Calvarium and visualized facial bones are intact, without suspicious lesions.  

 

Sinuses: There is moderate mucosal thickening seen within the left maxillary sinus and the left sphen
oid sinus, with milder mucosal thickening seen elsewhere. No significant abnormal fluid can be seen w
ithin the mastoid air cells.  

 

 

IMPRESSION:  

 

No intracranial hemorrhage is seen.   

 

No significant intracranial abnormality is seen.  

 

Focal moderate left sided sinus disease noted.

 

 

Note: No significant discrepancy from the preliminary report.

 

Reviewed by: Joseph Barber MD on 1/15/2022 8:20 AM [facility]

Approved by: Joseph Barber MD on 1/15/2022 8:20 AM [facility]

 

 

Station ID:  [id number]

## 2022-01-15 NOTE — HISTORY & PHYSICAL EXAMINATION
Chief Complaint





- Chief Complaint


Chief Complaint: hypothermia, confused, hypoglycemia





History of Present Illness





- Admitted From


Admitted From:: medical floor





- History Obtained From


Records Reviewed: [facility] and medical record


History obtained from: Pt


Exam Limitations: no 





- History of Present Illness


HPI Comment/Other: 


This is a 88-years old female with past medical history significant for 

hypertension, hyperlipidemia, atrial fibrillation, diabetic, history of brain 

hemorrhage and TIA and seizure Who brought by EMS to the ER for confusion and 

evaluation for the fall. Now patient is alert and oriented plus 4, her confusion

is resolved. Patient report she lives alone but her daughter just live 3 

blockage away from her and often come to her condom to check for her. Patient 

reported she did not remember why and how she had a fall in the home. EMS did 

Fingerstick on scene, glucose was 68. pt was given amp of D25, her glucose was 

increased to 130 then pt's mental status improved and became A&OX4 en route per 

Triage. pt was also found to have hypothermia in ER even pt was given bear 

huggar. CT of head, CXR, and Xray of knee reveals no acute process or fracture. 

Given above medical conditions, medical team was consulted for admission in 

observation unit.


Discussed the care goal with the patient, patient hope to have full code.











History





- Past Medical History


Cardiovascular: reports: Hypertension, High cholesterol, Atrial fibrillation


Respiratory: reports: None


Neuro: reports: Other


Endocrine/Autoimmune: reports: Type 2 diabetes


GI: reports: Hiatal hernia


: reports: None


HEENT: reports: None


Psych: reports: None


Musculoskeletal: reports: None


Derm: reports: None


MRSA Hx?: No





- Past Surgical History


General: reports: Cholecystectomy, Appendectomy


Ortho: reports: Other


/GYN: reports: Hysterectomy


HEENT: reports: Tonsil/Adenoidectomy





- Family & Social History


Family History: Mother: , Father: 


Family History Comment/Other: Patient reported his father had a medical history 

of Diabetic 1, her mother had heart issue with open heart surgery.


Social History Notes: Patient denies any history of cigarette smoking, alcohol 

or drug history





- POLST


Patient has POLST: No





Meds/Allgy





- Home Medications


Home Medications: 


                                Ambulatory Orders











 Medication  Instructions  Recorded  Confirmed


 


Ascorbic Acid [Vitamin C] 1,000 mg PO DAILY 10/14/14 01/15/22


 


Cholecalciferol (Vitamin D3) 1,000 unit PO DAILY 10/14/14 01/15/22





[Vitamin D]   


 


Digoxin [Lanoxin] 125 mcg PO DAILY 10/14/14 01/15/22


 


Magnesium Oxide [Magox 400] 400 mg PO DAILY 10/14/14 01/15/22


 


Multivitamin [Multi-Day Vitamins] 1 each PO DAILY 10/14/14 01/15/22


 


Omega-3/Dha/Epa/Fish Oil [Fish Oil] 1,000 mg PO BID 10/14/14 01/15/22


 


Vitamin E 400 unit PO DAILY 10/14/14 01/15/22


 


Glucosam/Chond-Msm1/C/Prem/Bor 1 tab ORAL DAILY 12/10/15 01/15/22





[Glucosa-Chond-MSM Complex Cplt]   


 


Insulin Glargine,Hum.rec.anlog 20 units SQ QPM 12/10/15 01/15/22





[Lantus Solostar]   


 


Liraglutide [Victoza 2-Sam] 1.8 mg SUBQ DAILY 12/10/15 01/15/22


 


Pravastatin Sodium 80 mg PO QPM 12/10/15 01/15/22


 


Amlodipine Besylate [Norvasc] 10 mg PO DAILY 01/15/22 01/15/22


 


Carvedilol [Coreg] 25 mg PO BIDWM 01/15/22 01/15/22


 


Insulin Aspart [NovoLOG] 10 unit SUBQ QDDINNER 01/15/22 01/15/22


 


Insulin Aspart [NovoLOG] 15 unit SUBQ QDBREAKFAST 01/15/22 01/15/22


 


Insulin Aspart [NovoLOG] 16 unit SUBQ QDLUNCH 01/15/22 01/15/22


 


Levetiracetam [Keppra] 500 mg PO BID 01/15/22 01/15/22


 


lisinopriL [Lisinopril] 20 mg PO BID 01/15/22 01/15/22














- Allergies


Allergies/Adverse Reactions: 


                                    Allergies











Allergy/AdvReac Type Severity Reaction Status Date / Time


 


codeine AdvReac  Nausea Verified 01/15/22 04:20














Review of Systems





- Constitutional


Constitutional: denies: Fever, Chills





- Eyes


Eyes: denies: Pain





- Ears, Nose & Throat


Ears, Nose & Throat: denies: Ear pain





- Cardiovascular


Cariovascular: denies: Palpitations, Chest pain, Exertional dyspnea, Decr. 

exercise tolerance





- Respiratory


Respiratory: denies: Cough, SOB at rest, SOB with exertion





- Gastrointestinal


Gastrointestinal: denies: Abdominal pain, Diarrhea, Nausea, Vomiting





- Genitourinary


Genitourinary: denies: Dysuria





- Musculoskeletal


Musculoskeletal: denies: Muscle pain





- Neurological


Neurological: denies: Focal weakness, Headache, Dizziness, Numbness, Abnormal 

gait, Seizures, Incoordination, Slurred speech





- Psychiatric


Psychiatric: denies: Depression





Exam





- Vital Signs


Vital Signs: 





                                Vital Signs x48h











  Temp Pulse Pulse Resp BP BP Pulse Ox


 


 01/15/22 09:01  36.8 C   79  20   172/65 H  97


 


 01/15/22 07:44  36.8 C  81   20  142/57 H   96


 


 01/15/22 05:44  35.0 C L      


 


 01/15/22 05:36  34.9 C L  54 L   16  126/55 L   95


 


 01/15/22 05:26  34.7 C L      


 


 01/15/22 05:00   56 L   17  133/59 H   96


 


 01/15/22 04:30   53 L   16  127/52 L   95


 


 01/15/22 04:10  33.2 C L  52 L   15  139/101 H   92


 


 01/15/22 02:35  32.7 C L      


 


 01/15/22 02:19  35.3 C L      


 


 01/15/22 02:14   97   18  106/67   97














- Physical Exam


General Appearance: positive: No acute distress, Alert.  negative: Lethargic


Eyes Bilateral: positive: Normal inspection, No lid inflammation


ENT: positive: ENT inspection nml, No signs of dehydration.  negative: Purulent 

nasal drainage


Neck: positive: Nml inspection, Trachea midline.  negative: Tracheal deviation


Respiratory: positive: Chest non-tender, No respiratory distress, Breath sounds 

nml.  negative: Wheezes, Rales


Cardiovascular: positive: Regular rate & rhythm, Systolic murmur, Diastolic 

murmur.  negative: Tachycardia, Bradycardia


Peripheral Pulses: positive: 2+


Abdomen: positive: Non-tender, Nml bowel sounds, No distention.  negative: 

Tenderness


Back: positive: Nml inspection


Skin: positive: Color nml, Warm, Dry.  negative: Cyanosis


Extremities: positive: Non-tender, Full ROM, Nml appearance


Neurologic/Psychiatric: positive: Oriented x3, Motor nml, Sensation nml.  

negative: Weakness, Sensory loss, Facial droop, Slurred/abnml speech, Depressed 

mood/affect





Conclusion/Plan





- Problem List


(1) Confused


Conclusion/Plan: 


pt presented confused when EMS picked up pt per report, at the time pt's glucose

was 68. after pt was given D25, her glucose became 130, her confusion was 

resolved. now pt is alert and oriented plus 4. CT of head was unremarkable for 

acute process. It is likely hypoglycemia to cause her confusion. pt has hx of 

diabetes, she took significant amount of insulin at home. we will educate pt for

how to prevention of hypoglycemia and reduce her home insulin dosage. strongly 

advise pt followup with her PCP continue management of her diabetes and 

prevention of hypoglycemia. 








(2) Hypoglycemia


Conclusion/Plan: 


 pt was found to have hypoglycemia when EMS picked up pt, and pt became 

confused. but after her hypoglycemia was resolved, she became oriented plus 4. 

pt has hx of diabetes, she took significant amount of insulin at home. we will 

educate pt for how to prevention of hypoglycemia and reduce her home insulin 

dosage. strongly advise pt followup with her PCP continue management of her 

diabetes and prevention of hypoglycemia.








(3) Hypothermia


Conclusion/Plan: 


Now patient's hypothermia is resolved. Patient'd TSH and a free T4 all are at 

normal range. Patient reports she had a normal temperature set at home. It is 

presumed because of patient's hypoglycemia To cause her hypothermia. 








(4) Diabetes mellitus


Conclusion/Plan: 


Patient has a history of diabetes, we will check A1c, we will start sliding 

scale, we will check glucose and that we will strongly and extensive educate to 

patient for prevention of hypoglycemia.








(5) Atrial fibrillation


Conclusion/Plan: 


HR is stable. Patient has a history of atrial fibrillation, patient also has a 

history of hemorrhage stroke, Patient has no blood thinner at her home 

medication list. We will resume patient Coreg, digoxin, continue telemetry 

monitor. 








(6) Hx of seizure disorder


Conclusion/Plan: 


Stable, patient has no seizure, will resume home Keppra








(7) HTN (hypertension)


Conclusion/Plan: 


We will resume patient home blood pressure medicine, continue vital signs 

monitor








- Lab Results


Fish Bones: 


                                 01/15/22 03:00





                                 01/15/22 03:00





Core Measures





- Anticipated LOS


I expect patient to be DC'd or transferred within 96 hours.: Yes





- DVT/VTE - Prophylaxis


VTE/DVT Device ordered at admit?: Yes


VTE/DVT Prophylaxis med ordered at admit?: Yes

## 2022-01-15 NOTE — XRAY REPORT
PROCEDURE:  Knee 2 View RT

 

INDICATIONS:  knee pain, abrasion s/p fall from bed

 

TECHNIQUE:  2 views of the right knee(s) were acquired.  

 

COMPARISON:  3/10/2015

 

FINDINGS:  

 

Bones:  No fractures or dislocations.  No suspicious bony lesions.

 

There is moderate medial femorotibial joint space narrowing, with associated degenerative change with
 subchondral sclerosis and osteophyte formation.   

 

Soft tissues: There is a mild joint effusion.  No suspicious soft tissue calcifications.  

 

 

 

IMPRESSION:  No acute fracture can be seen.

 

Mild joint effusion.

 

Moderate medial femorotibial joint space narrowing with associated remodeling change.

 

If it would be helpful for clinical management decision making, please consider a dedicated, schedule
d knee MRI for further evaluation (assuming that there is no contraindication).  

 

 

Note: No significant discrepancy from the preliminary report.

 

Reviewed by: Joseph Barber MD on 1/15/2022 8:23 AM [facility]

Approved by: Joseph Barber MD on 1/15/2022 8:23 AM [facility]

 

 

Station ID:  [id number]

## 2022-01-15 NOTE — ED PHYSICIAN DOCUMENTATION
History of Present Illness





- Stated complaint


Stated Complaint: AMS





- Chief complaint


Chief Complaint: Neuro





- History obtained from


History obtained from: Patient, Family (dAUGHTER), EMS





- Additonal information


Additional information: 





88yF with PMH diabetes on insulin, multiple "brain bleeds", recent possible TIA 

after being seen at [facility] for fall from bed with confusion last week, 

p/w fall from bed and confusion. Patient was behaving normally during the day 

yesterday and was LSN 9pm. She was found by family lying on the floor next to 

her bed just PTA, unsure how she got there and apparently confused and ems was 

called.  Fingerstick on scene 68 improving to 130 s/p amp of d25 with concurrent

improvement in mental status. Patient is now AOX3 but still can't remember how 

she ended up on the floor. no FND. does have abrasion to L upper face and R 

knee. not on blood thinners. no other complaints. 





Review of Systems


Ten Systems: 10 systems reviewed and negative


Constitutional: denies: Fever, Chills


Eyes: denies: Loss of vision


Ears: denies: Loss of hearing


Cardiac: denies: Chest pain / pressure


Respiratory: denies: Dyspnea


GI: denies: Abdominal Pain, Nausea, Vomiting


: denies: Dysuria, Frequency


Skin: reports: Abrasion (s)


Musculoskeletal: denies: Neck pain, Back pain





PD PAST MEDICAL HISTORY





- Past Medical History


Cardiovascular: Hypertension, High cholesterol, Atrial fibrillation


Respiratory: None


Neuro: Other


Endocrine/Autoimmune: Type 2 diabetes


GI: Hiatal hernia


: None


HEENT: None


Psych: None


Musculoskeletal: None


Derm: None





- Past Surgical History


Past Surgical History: Yes


General: Cholecystectomy, Appendectomy


Ortho: Other


/GYN: Hysterectomy


HEENT: Tonsil/Adenoidectomy





- Present Medications


Home Medications: 


                                Ambulatory Orders











 Medication  Instructions  Recorded  Confirmed


 


Ascorbic Acid [Vitamin C] 1,000 mg PO DAILY 10/14/14 02/12/21


 


Cholecalciferol (Vitamin D3) 1,000 unit PO DAILY 10/14/14 02/12/21





[Vitamin D]   


 


Digoxin [Lanoxin] 0.125 mg PO DAILY 10/14/14 02/12/21


 


Lisinopril 10 mg PO DAILY 10/14/14 02/12/21


 


Magnesium Oxide [Magox 400] 400 mg PO DAILY 10/14/14 02/12/21


 


Multivitamin [Multi-Day Vitamins] 1 each PO DAILY 10/14/14 02/12/21


 


Omega-3/Dha/Epa/Fish Oil [Fish Oil] 1,000 mg PO BID 10/14/14 02/12/21


 


Vitamin E 400 unit PO DAILY 10/14/14 02/12/21


 


Glucosam/Chond-Msm1/C/Prem/Bor 1 tab ORAL DAILY 12/10/15 02/12/21





[Glucosa-Chond-MSM Complex Cplt]   


 


Insulin Glargine,Hum.rec.anlog 42 units SQ DAILY 12/10/15 02/12/21





[Lantus Solostar]   


 


Liraglutide [Victoza 2-Sam] 1.8 units SQ DAILY 12/10/15 02/12/21


 


Pravastatin Sodium 80 mg PO DAILY 12/10/15 02/12/21


 


amLODIPine [Norvasc] 10 mg PO DAILY 02/12/21 02/12/21


 


Levetiracetam [Keppra] 500 mg PO DAILY 01/15/22 01/15/22














- Allergies


Allergies/Adverse Reactions: 


                                    Allergies











Allergy/AdvReac Type Severity Reaction Status Date / Time


 


codeine AdvReac  Nausea Verified 01/15/22 04:20














- Social History


Does the pt smoke?: No


Smoking Status: Never smoker


Does the pt drink ETOH?: Yes


Does the pt have substance abuse?: No





- Immunizations


Immunizations are current?: Yes





- POLST


Patient has POLST: No





PD ED PE NORMAL





- Vitals


Vital signs reviewed: Yes





- General


General: Alert and oriented X 3, No acute distress, Well developed/nourished, 

Other (elderly appearing)





- HEENT


HEENT: Atraumatic, PERRL, EOMI





- Neck


Neck: Supple, no meningeal sign, No bony TTP





- Cardiac


Cardiac: RRR





- Respiratory


Respiratory: No respiratory distress, Clear bilaterally





- Abdomen


Abdomen: Non tender, Non distended





- Back


Back: No CVA TTP





- Derm


Derm: Normal color





- Extremities


Extremities: No deformity, Other (BL LE weakness daughter states is at baseline.

 no assymmetry)





- Neuro


Neuro: Alert and oriented X 3, CNs 2-12 intact, No motor deficit, No sensory 

deficit, Normal speech





- Psych


Psych: Normal mood, Normal affect





Results





- Vitals


Vitals: 


                               Vital Signs - 24 hr











  01/15/22 01/15/22 01/15/22





  02:14 02:19 02:35


 


Temperature  35.3 C L 32.7 C L


 


Heart Rate 97  


 


Respiratory 18  





Rate   


 


Blood Pressure 106/67  


 


O2 Saturation 97  














  01/15/22 01/15/22 01/15/22





  04:10 04:30 05:00


 


Temperature 33.2 C L  


 


Heart Rate 52 L 53 L 56 L


 


Respiratory 15 16 17





Rate   


 


Blood Pressure 139/101 H 127/52 L 133/59 H


 


O2 Saturation 92 95 96














  01/15/22 01/15/22 01/15/22





  05:26 05:36 05:44


 


Temperature 34.7 C L 34.9 C L 35.0 C L


 


Heart Rate  54 L 


 


Respiratory  16 





Rate   


 


Blood Pressure  126/55 L 


 


O2 Saturation  95 








                                     Oxygen











O2 Source                      Room air

















- EKG (time done)


  ** 0502


Rate: Rate (enter#) (55)


Rhythm: NSR


Intervals: Normal SC


QRS: Normal


Computer interpretation: Agree with computer





- Labs


Labs: 


                                Laboratory Tests











  01/15/22 01/15/22 01/15/22





  03:00 03:00 03:00


 


WBC  5.0  


 


RBC  5.16  


 


Hgb  15.1  


 


Hct  46.1  


 


MCV  89.3  


 


MCH  29.3  


 


MCHC  32.8  


 


RDW  12.4  


 


Plt Count  136  


 


MPV  10.3  


 


Neut # (Auto)  3.9  


 


Lymph # (Auto)  0.8 L  


 


Mono # (Auto)  0.3  


 


Eos # (Auto)  0.1  


 


Baso # (Auto)  0.0  


 


Absolute Nucleated RBC  0.00  


 


Nucleated RBC %  0.0  


 


Sodium   138 


 


Potassium   3.8 


 


Chloride   102 


 


Carbon Dioxide   25 


 


Anion Gap   11.0 


 


BUN   26 H 


 


Creatinine   0.6 


 


Estimated GFR (MDRD)   94 


 


Glucose   105 H 


 


Calcium   8.9 


 


Total Bilirubin   0.5 


 


AST   24 


 


ALT   20 


 


Alkaline Phosphatase   66 


 


Total Creatine Kinase   147 


 


Total Protein   6.9 


 


Albumin   4.0 


 


Globulin   2.9 


 


Albumin/Globulin Ratio   1.4 


 


Lipase   42 


 


TSH    1.68


 


Free T4    0.87


 


Thyroxine (T4)    8.47


 


Urine Color   


 


Urine Clarity   


 


Urine pH   


 


Ur Specific Gravity   


 


Urine Protein   


 


Urine Glucose (UA)   


 


Urine Ketones   


 


Urine Occult Blood   


 


Urine Nitrite   


 


Urine Bilirubin   


 


Urine Urobilinogen   


 


Ur Leukocyte Esterase   


 


Urine RBC   


 


Urine WBC   


 


Ur Squamous Epith Cells   


 


Urine Bacteria   


 


Urine Culture Comments   


 


Nasal Adenovirus (PCR)   


 


Nasal B. parapertussis DNA (PCR)   


 


Nasal Coronavir 229E PCR   


 


Nasal Coronavir HKU1 PCR   


 


Nasal Coronavir NL63 PCR   


 


Nasal Coronavir OC43 PCR   


 


Nasal Enterovir/Rhinovir PCR   


 


Nasal Influenza B PCR   


 


Nasal Influenza A PCR   


 


Nasal Parainfluen 1 PCR   


 


Nasal Parainfluen 2 PCR   


 


Nasal Parainfluen 3 PCR   


 


Nasal Parainfluen 4 PCR   


 


Nasal RSV (PCR)   


 


Nasal B.pertussis DNA PCR   


 


Nasal C.pneumoniae (PCR)   


 


Tenzin Human Metapneumo PCR   


 


Nasal M.pneumoniae (PCR)   


 


Nasal SARS-CoV-2 (PCR)   


 


Last Dose Date   


 


Last Dose Time   


 


Digoxin   














  01/15/22 01/15/22 01/15/22





  03:00 04:00 04:53


 


WBC   


 


RBC   


 


Hgb   


 


Hct   


 


MCV   


 


MCH   


 


MCHC   


 


RDW   


 


Plt Count   


 


MPV   


 


Neut # (Auto)   


 


Lymph # (Auto)   


 


Mono # (Auto)   


 


Eos # (Auto)   


 


Baso # (Auto)   


 


Absolute Nucleated RBC   


 


Nucleated RBC %   


 


Sodium   


 


Potassium   


 


Chloride   


 


Carbon Dioxide   


 


Anion Gap   


 


BUN   


 


Creatinine   


 


Estimated GFR (MDRD)   


 


Glucose   


 


Calcium   


 


Total Bilirubin   


 


AST   


 


ALT   


 


Alkaline Phosphatase   


 


Total Creatine Kinase   


 


Total Protein   


 


Albumin   


 


Globulin   


 


Albumin/Globulin Ratio   


 


Lipase   


 


TSH   


 


Free T4   


 


Thyroxine (T4)   


 


Urine Color    YELLOW


 


Urine Clarity    CLEAR


 


Urine pH    5.5


 


Ur Specific Gravity    1.025


 


Urine Protein    NEGATIVE


 


Urine Glucose (UA)    NEGATIVE


 


Urine Ketones    NEGATIVE


 


Urine Occult Blood    NEGATIVE


 


Urine Nitrite    NEGATIVE


 


Urine Bilirubin    NEGATIVE


 


Urine Urobilinogen    0.2 (NORMAL)


 


Ur Leukocyte Esterase    NEGATIVE


 


Urine RBC    None Seen


 


Urine WBC    0-3


 


Ur Squamous Epith Cells    NONE SEEN


 


Urine Bacteria    None Seen


 


Urine Culture Comments    NOT INDICATED


 


Nasal Adenovirus (PCR)   NOT DETECTED 


 


Nasal B. parapertussis DNA (PCR)   NOT DETECTED 


 


Nasal Coronavir 229E PCR   NOT DETECTED 


 


Nasal Coronavir HKU1 PCR   NOT DETECTED 


 


Nasal Coronavir NL63 PCR   NOT DETECTED 


 


Nasal Coronavir OC43 PCR   NOT DETECTED 


 


Nasal Enterovir/Rhinovir PCR   NOT DETECTED 


 


Nasal Influenza B PCR   NOT DETECTED 


 


Nasal Influenza A PCR   NOT DETECTED 


 


Nasal Parainfluen 1 PCR   NOT DETECTED 


 


Nasal Parainfluen 2 PCR   NOT DETECTED 


 


Nasal Parainfluen 3 PCR   NOT DETECTED 


 


Nasal Parainfluen 4 PCR   NOT DETECTED 


 


Nasal RSV (PCR)   NOT DETECTED 


 


Nasal B.pertussis DNA PCR   NOT DETECTED 


 


Nasal C.pneumoniae (PCR)   NOT DETECTED 


 


Tenzin Human Metapneumo PCR   NOT DETECTED 


 


Nasal M.pneumoniae (PCR)   NOT DETECTED 


 


Nasal SARS-CoV-2 (PCR)   NOT DETECTED 


 


Last Dose Date  UNK  


 


Last Dose Time  UNK  


 


Digoxin  0.4  














PD MEDICAL DECISION MAKING





- ED course


ED course: 





88yF presents s/p fall from bed with confusion, resolving after hypoglycemia was

 corrected. Patient with rectal temp 90.8 in the ED. She is c/o feeling cold. 

Bear hugger applied. no symptoms concerning for infection. vitals otherwise WNL.

 no history thyroid disease. will send labs, reevaluate. 





CT head noncontributory. knee xray without acute fracture. labs unremarkable. 

Patient is still hypothermic despite bearhugger. Thyroid tests are normal. 

environmental exposure unlikely given she has been in continuous warm setting. 

Labs show no indication of malnourishment. Glucose has been corrected and is now

 normal here.  Daughter states patient has been asymptomatic, no source for 

apparent infection. We will check a urine and chest xray for infectious source 

in addition to lactic acid, blood cultures. Will also trial decadron in case of 

idiopathic adrenal insufficiency as cause. Warmed IVF are going in now.





Note that daughter Shania stated prior to leaving that ongoing conversations are 

happening about her mother's wishes but that she does not have any advanced 

directive or POLST in place at this time. patient is full code, full 

interventions.





CXR and u/a are clear. infection seeming unlikely. dig level normal. still with 

core whiteside temp 35 deg celsius. No clear explanation for her hypothermia aside 

from potential of environmental exposure given she is elderly and unable to 

thermoregulate as well and had been on the floor for unspecified time prior to 

EMS arrival. d/w Dr. LAMA for observation admission. 





Departure





- Departure


Disposition: ED Place in Observation


Clinical Impression: 


 Hypoglycemia, Altered mental status, Abrasion, Fall from bed, Hypothermia





Condition: Stable


Instructions:  Hypoglycemia

## 2022-01-15 NOTE — XRAY REPORT
PROCEDURE:  Chest 1 View X-Ray

 

INDICATIONS:  hypothermic, clinical concern for sepsis

 

TECHNIQUE:  One view of the chest was acquired.  

 

COMPARISON:  2/12/2021, 9/19/2020, 8/15/2020

 

FINDINGS:  

 

Surgical changes and devices:  None.  

 

Lungs and pleura:  No pleural effusions or pneumothorax.  Low lung volumes can be seen, causing a 
wded appearance to the lung markings. Mild streaky opacities can be seen at the lung bases. 

 

Mediastinum:  Mediastinal contours appear normal.  Heart size is normal.  

 

Bones and chest wall:  No suspicious bony lesions. Age-appropriate degenerative changes are seen.  S-
shaped scoliotic curvature is incidentally noted.   Overlying soft tissues appear unremarkable.  

 

 

 

IMPRESSION:  No yuliet infiltrates are seen.

 

Low lung volumes, with likely atelectasis at the lung bases.

 

Please consider a short-term follow-up 2 view chest series (performed in deep inspiration) for furthe
r evaluation.  

 

 

Note: No significant discrepancy from the preliminary report.

 

Reviewed by: Joseph Barber MD on 1/15/2022 8:22 AM [facility]

Approved by: Joseph Barber MD on 1/15/2022 8:22 AM [facility]

 

 

Station ID:  [id number]

## 2022-01-16 LAB
ANION GAP SERPL CALCULATED.4IONS-SCNC: 10 MMOL/L (ref 6–13)
BASOPHILS NFR BLD AUTO: 0 10^3/UL (ref 0–0.1)
BASOPHILS NFR BLD AUTO: 0.2 %
BUN SERPL-MCNC: 32 MG/DL (ref 6–20)
CALCIUM UR-MCNC: 8.3 MG/DL (ref 8.5–10.3)
CHLORIDE SERPL-SCNC: 103 MMOL/L (ref 101–111)
CO2 SERPL-SCNC: 24 MMOL/L (ref 21–32)
CREAT SERPLBLD-SCNC: 0.8 MG/DL (ref 0.4–1)
EOSINOPHIL # BLD AUTO: 0 10^3/UL (ref 0–0.7)
EOSINOPHIL NFR BLD AUTO: 0 %
ERYTHROCYTE [DISTWIDTH] IN BLOOD BY AUTOMATED COUNT: 12.7 % (ref 12–15)
FERRITIN SERPL-MCNC: 28.1 NG/ML (ref 11–306.8)
GFRSERPLBLD MDRD-ARVRAT: 68 ML/MIN/{1.73_M2} (ref 89–?)
GLUCOSE SERPL-MCNC: 237 MG/DL (ref 70–100)
HCT VFR BLD AUTO: 34.9 % (ref 37–47)
HCT VFR BLD AUTO: 36.5 % (ref 37–47)
HCT VFR BLD AUTO: 37.1 % (ref 37–47)
HEMOCCULT STL QL IA: POSITIVE
HGB UR QL STRIP: 11.5 G/DL (ref 12–16)
HGB UR QL STRIP: 11.9 G/DL (ref 12–16)
HGB UR QL STRIP: 12.1 G/DL (ref 12–16)
IRON SATN MFR SERPL: 19 % (ref 20–50)
IRON SERPL-MCNC: 59 UG/DL (ref 28–170)
LYMPHOCYTES # SPEC AUTO: 0.8 10^3/UL (ref 1.5–3.5)
LYMPHOCYTES NFR BLD AUTO: 17.5 %
MCH RBC QN AUTO: 29.1 PG (ref 27–31)
MCHC RBC AUTO-ENTMCNC: 33 G/DL (ref 32–36)
MCV RBC AUTO: 88.4 FL (ref 81–99)
MONOCYTES # BLD AUTO: 0.6 10^3/UL (ref 0–1)
MONOCYTES NFR BLD AUTO: 12.1 %
NEUTROPHILS # BLD AUTO: 3.4 10^3/UL (ref 1.5–6.6)
NEUTROPHILS # SNV AUTO: 4.8 X10^3/UL (ref 4.8–10.8)
NEUTROPHILS NFR BLD AUTO: 69.8 %
NRBC # BLD AUTO: 0 /100WBC
NRBC # BLD AUTO: 0 X10^3/UL
PDW BLD AUTO: 10.4 FL (ref 7.9–10.8)
PLATELET # BLD: 145 10^3/UL (ref 130–450)
POTASSIUM SERPL-SCNC: 4 MMOL/L (ref 3.5–5)
RBC MAR: 3.95 10^6/UL (ref 4.2–5.4)
RBC MAR: 3.96 10^6/UL (ref 4.2–5.4)
RETICS # AUTO: 0.07 10^6/UL (ref 0.02–0.11)
RETICS.HYPOCHROMIC/RBC NFR AUTO: 1.9 % (ref 0.5–2.3)
SODIUM SERPLBLD-SCNC: 137 MMOL/L (ref 135–145)
TIBC SERPL-MCNC: 316 UG/DL (ref 250–450)
TRANSFERRIN SERPL-MCNC: 226 MG/DL (ref 192–382)
VIT B12 SERPL-MCNC: 477 PG/ML (ref 180–914)

## 2022-01-16 RX ADMIN — SODIUM CHLORIDE, PRESERVATIVE FREE SCH ML: 5 INJECTION INTRAVENOUS at 02:53

## 2022-01-16 RX ADMIN — INSULIN ASPART SCH UNIT: 100 INJECTION, SOLUTION INTRAVENOUS; SUBCUTANEOUS at 08:30

## 2022-01-16 RX ADMIN — DIGOXIN SCH MCG: 125 TABLET ORAL at 08:31

## 2022-01-16 RX ADMIN — INSULIN ASPART SCH UNIT: 100 INJECTION, SOLUTION INTRAVENOUS; SUBCUTANEOUS at 17:04

## 2022-01-16 RX ADMIN — INSULIN ASPART SCH UNIT: 100 INJECTION, SOLUTION INTRAVENOUS; SUBCUTANEOUS at 17:06

## 2022-01-16 RX ADMIN — INSULIN GLARGINE SCH UNIT: 100 INJECTION, SOLUTION SUBCUTANEOUS at 21:30

## 2022-01-16 RX ADMIN — PRAVASTATIN SODIUM SCH MG: 40 TABLET ORAL at 21:30

## 2022-01-16 RX ADMIN — SODIUM CHLORIDE SCH MG: 9 INJECTION, SOLUTION INTRAVENOUS at 11:43

## 2022-01-16 RX ADMIN — Medication SCH MG: at 08:31

## 2022-01-16 RX ADMIN — INSULIN ASPART SCH UNIT: 100 INJECTION, SOLUTION INTRAVENOUS; SUBCUTANEOUS at 21:30

## 2022-01-16 RX ADMIN — ENOXAPARIN SODIUM SCH MG: 100 INJECTION SUBCUTANEOUS at 08:31

## 2022-01-16 RX ADMIN — THERA TABS SCH TAB: TAB at 08:31

## 2022-01-16 RX ADMIN — SODIUM CHLORIDE, PRESERVATIVE FREE SCH ML: 5 INJECTION INTRAVENOUS at 17:06

## 2022-01-16 RX ADMIN — INSULIN ASPART SCH UNIT: 100 INJECTION, SOLUTION INTRAVENOUS; SUBCUTANEOUS at 08:31

## 2022-01-16 RX ADMIN — INSULIN ASPART SCH UNIT: 100 INJECTION, SOLUTION INTRAVENOUS; SUBCUTANEOUS at 11:55

## 2022-01-16 RX ADMIN — SODIUM CHLORIDE, PRESERVATIVE FREE SCH ML: 5 INJECTION INTRAVENOUS at 08:31

## 2022-01-16 RX ADMIN — SODIUM CHLORIDE SCH MG: 9 INJECTION, SOLUTION INTRAVENOUS at 21:30

## 2022-01-16 NOTE — PHARMACY PROGRESS NOTE
- Best Possible Medication History


Admit Date and Time: 01/16/22 1039


Processed by: Pharmacy


Medication History completed: Yes


Secondary Source(s): Pharmacy records, Insurance records





As the person ultimately responsible for medication therapy, providers are able 

to order a medication from an existing home medication list in [facility] via the 

"Reconcile Routine" prior to Confirmation of that medication by support staff. 

Such practice is discouraged except when the physician, in their clinical 

judgment, deems that a medical need exists for a medication without regard to 

previous use.

## 2022-01-16 NOTE — PROVIDER PROGRESS NOTE
Assessment/Plan





- Problem List


(1) GI bleed


Assessment/Plan: 





1/16 pt's HGB is drop to 11.5 from 15. pt denies abdominal pain, report normal 

bowel movement color but occult stool test is positive for GI bleed. anemia 

study is unremarkable. 


plan: H&H monitor, start with Protonix IV bid, consult with GI surgeon and plan 

to have EGD on tomorrow morning, then NPO after midnight. 





(2) Confused


Conclusion/Plan: 


1/16 resolved, pt is alert and oriented plus 4





pt presented confused when EMS picked up pt per report, at the time pt's glucose

was 68. after pt was given D25, her glucose became 130, her confusion was 

resolved. now pt is alert and oriented plus 4. CT of head was unremarkable for 

acute process. It is likely hypoglycemia to cause her confusion. pt has hx of 

diabetes, she took significant amount of insulin at home. we will educate pt for

how to prevention of hypoglycemia and reduce her home insulin dosage. strongly 

advise pt followup with her PCP continue management of her diabetes and 

prevention of hypoglycemia. 








(2) Hypoglycemia


Conclusion/Plan: 


1/16 resolved. start with slide scale, home Lantus, and Novolog tid scheduled, 

hypoglycemia protocol, blood glucose check 





pt was found to have hypoglycemia when EMS picked up pt, and pt became confused.

but after her hypoglycemia was resolved, she became oriented plus 4. pt has hx 

of diabetes, she took significant amount of insulin at home. we will educate pt 

for how to prevention of hypoglycemia and reduce her home insulin dosage. 

strongly advise pt followup with her PCP continue management of her diabetes and

prevention of hypoglycemia.








(3) Hypothermia


Conclusion/Plan: 


1/16 resolved





Now patient's hypothermia is resolved. Patient'd TSH and a free T4 all are at 

normal range. Patient reports she had a normal temperature set at home. It is 

presumed because of patient's hypoglycemia To cause her hypothermia. 








(4) Diabetes mellitus


Conclusion/Plan: 


Patient has a history of diabetes, we will check A1c, we will start sliding 

scale, we will check glucose and that we will strongly and extensive educate to 

patient for prevention of hypoglycemia.








(5) Atrial fibrillation


Conclusion/Plan: 


HR is stable. Patient has a history of atrial fibrillation, patient also has a 

history of hemorrhage stroke, Patient has no blood thinner at her home 

medication list. We will resume patient Coreg, digoxin, continue telemetry 

monitor. 








(6) Hx of seizure disorder


Conclusion/Plan: 


Stable, patient has no seizure, will resume home Keppra








(7) HTN (hypertension)


Conclusion/Plan: 


We will resume patient home blood pressure medicine, continue vital signs 

monitor





























- Current Meds


Current Meds: 





                               Current Medications











Generic Name Dose Route Start Last Admin





  Trade Name Minerva  PRN Reason Stop Dose Admin


 


Amlodipine Besylate  10 mg  01/16/22 09:00  01/16/22 08:31





  Amlodipine 5 Mg Tablet  PO   10 mg





  DAILY MARYBETH   Administration


 


Carvedilol  25 mg  01/15/22 10:00  01/16/22 08:31





  Carvedilol 12.5 Mg Tablet  PO   25 mg





  BIDWM MARYBETH   Administration


 


Digoxin  125 mcg  01/15/22 09:00  01/16/22 08:31





  Digoxin 125 Mcg Tablet  PO   125 mcg





  DAILY MARYBETH   Administration


 


Insulin Aspart  3 - 11 unit  01/15/22 17:18  01/16/22 11:55





  Insulin Aspart 300 Unit/3 Ml Pen  SUBQ   3 unit





  0800,1200,1700,2100 MARYBETH   Administration





  Protocol  


 


Insulin Aspart  5 unit  01/16/22 08:00  01/16/22 11:55





  Insulin Aspart 300 Unit/3 Ml Pen  SUBQ   5 unit





  TIDWM MARYBETH   Administration





  Protocol  


 


Insulin Glargine  20 unit  01/15/22 21:00  01/15/22 20:23





  Insulin Glargine 300 Unit/3 Ml Pen  SUBQ   20 unit





  QPM MARYBETH   Administration


 


Levetiracetam  500 mg  01/15/22 10:00  01/16/22 08:31





  Levetiracetam 250 Mg Tablet  PO   500 mg





  BID MARYBETH   Administration


 


Lisinopril  20 mg  01/15/22 09:00  01/16/22 08:31





  Lisinopril 20 Mg Tablet  PO   20 mg





  BID MARYBETH   Administration


 


Magnesium Oxide  400 mg  01/15/22 09:00  01/16/22 08:31





  Magnesium Oxide 400 Mg Tablet  PO   400 mg





  DAILY MARYBETH   Administration


 


Multivitamins  1 tab  01/16/22 08:00  01/16/22 08:31





  Multivitamin Tablet  PO   1 tab





  DAILYWM MARYBETH   Administration


 


Pantoprazole Sodium  40 mg  01/16/22 11:00  01/16/22 11:43





  Pantoprazole 40 Mg Vial  IVP   40 mg





  BID MARYBETH   Administration


 


Pravastatin Sodium  80 mg  01/15/22 21:00  01/15/22 20:23





  Pravastatin 40 Mg Tablet  PO   80 mg





  QPM MARYBETH   Administration


 


Sodium Chloride  10 ml  01/15/22 07:19  01/16/22 11:43





  Sodium Chloride Flush 0.9% 10 Ml Syringe  IVP   10 ml





  PRN PRN   Administration





  AS NEEDED PER PROVIDER ORDERS  


 


Sodium Chloride  10 ml  01/15/22 09:00  01/16/22 08:31





  Sodium Chloride Flush 0.9% 10 Ml Syringe  IVP   10 ml





  0100,0900,1700 MARYBETH   Administration














- Lab Result


Fish Bone Diagrams: 


                                 01/16/22 05:45





                                 01/16/22 05:45





- Additional Planning


My Orders: 





My Active Orders





01/15/22 17:12


Blood Glucose Checks - Eating [RC] 0800,1200,1700,2100 


Initiate Hypoglycemia Protocol [RC] .protocol 





01/15/22 17:18


Insulin Aspart [NovoLOG]   3 - 11 unit SUBQ 0800,1200,1700,2100 





01/15/22 21:00


Insulin Glargine [Lantus Solostar]   20 unit SUBQ QPM 


Pravastatin [Pravachol]   80 mg PO QPM 





01/16/22


General Surgery Consult [CONS] Routine 


Home Health Referral [CONS] Routine 


Social Work Consult [CONS] Routine 





01/16/22 08:00


Insulin Aspart [NovoLOG]   5 unit SUBQ TIDWM 


Multivitamin [Theragran]   1 tab PO DAILYWM 





01/16/22 09:00


amLODIPine [Norvasc]   10 mg PO DAILY 





01/16/22 11:00


Pantoprazole [Protonix]   40 mg IVP BID 





01/16/22 11:08


Collins Discontinuation [RC] ONCE 





01/16/22 14:00


H&H [HEMOGLOBIN AND HEMATOCRIT] [HEME] Timed 





01/16/22 22:00


H&H [HEMOGLOBIN AND HEMATOCRIT] [HEME] Timed 





01/17/22 05:00


BMP - BASIC METABOLIC PANEL [CHEM] DAILYLAB 


CBC - COMP BLD CT W/AUTO DIFF [HEME] DAILYLAB 





01/18/22 05:00


BMP - BASIC METABOLIC PANEL [CHEM] DAILYLAB 


CBC - COMP BLD CT W/AUTO DIFF [HEME] DAILYLAB 





01/19/22 05:00


BMP - BASIC METABOLIC PANEL [CHEM] DAILYLAB 


CBC - COMP BLD CT W/AUTO DIFF [HEME] DAILYLAB 














Subjective





- Subjective


Patient Reports: Resting Comfortably, No Complaints





Objective


Vital Signs: 





                               Vital Signs - 24 hr











  01/15/22 01/15/22 01/16/22





  15:24 20:21 00:23


 


Temperature 36.9 C 36.5 C 36.6 C


 


Heart Rate [ 82 82 





Brachial]   


 


Heart Rate [   77





Monitoring   





electrodes]   


 


Respiratory 16 18 18





Rate   


 


Blood Pressure 132/51 H 139/53 H 





[Left Brachial   





artery]   


 


Blood Pressure   134/52 H





[Left Radial   





artery]   


 


O2 Saturation 97 97 97














  01/16/22 01/16/22





  08:00 11:42


 


Temperature 36.7 C 36.7 C


 


Heart Rate [ 72 65





Brachial]  


 


Heart Rate [  





Monitoring  





electrodes]  


 


Respiratory 20 18





Rate  


 


Blood Pressure 144/57 H 143/52 H





[Left Brachial  





artery]  


 


Blood Pressure  





[Left Radial  





artery]  


 


O2 Saturation 97 97








                                     Oxygen











O2 Source                      Room air














I&O (Last 24 Hrs): 





                          Intake and Output Totals x24h











 01/14/22 01/15/22 01/16/22





 23:59 23:59 23:59


 


Intake Total  2440 240


 


Output Total  1200 525


 


Balance  1240 -285











General: Alert, Oriented x3, Cooperative, No acute distress


HEENT: Atraumatic


Neck: Supple


Lymphatic: no adenopathy


Neuro: Alert, Non Focal, Oriented Times 3


Cardiovascular: Regular rate, Normal S1, Normal S2, Other (systolic Murmur)


Respiratory: Chest non-tender, No respiratory distress, Breath sounds nml


Abdomen: Normal bowel sounds, Soft, No tenderness


Extremities: Normal pulses





- Results


Results: 





                               Laboratory Results











WBC  4.8 x10^3/uL (4.8-10.8)   01/16/22  05:45    


 


RBC  3.95 10^6/uL (4.20-5.40)  L  01/16/22  05:45    


 


RBC  3.96 10^6/uL (4.20-5.40)  L  01/16/22  05:45    


 


Hgb  11.5 g/dL (12.0-16.0)  L  01/16/22  05:45    


 


Hct  34.9 % (37.0-47.0)  L  01/16/22  05:45    


 


MCV  88.4 fL (81.0-99.0)   01/16/22  05:45    


 


MCH  29.1 pg (27.0-31.0)   01/16/22  05:45    


 


MCHC  33.0 g/dL (32.0-36.0)   01/16/22  05:45    


 


RDW  12.7 % (12.0-15.0)   01/16/22  05:45    


 


Plt Count  145 10^3/uL (130-450)   01/16/22  05:45    


 


MPV  10.4 fL (7.9-10.8)   01/16/22  05:45    


 


Reticulocyte % (Auto)  1.90 % (0.5-2.3)   01/16/22  05:45    


 


Neut # (Auto)  3.4 10^3/uL (1.5-6.6)   01/16/22  05:45    


 


Lymph # (Auto)  0.8 10^3/uL (1.5-3.5)  L  01/16/22  05:45    


 


Mono # (Auto)  0.6 10^3/uL (0.0-1.0)   01/16/22  05:45    


 


Eos # (Auto)  0.0 10^3/uL (0.0-0.7)   01/16/22  05:45    


 


Baso # (Auto)  0.0 10^3/uL (0.0-0.1)   01/16/22  05:45    


 


Absolute Nucleated RBC  0.00 x10^3/uL  01/16/22  05:45    


 


Nucleated RBC %  0.0 /100WBC  01/16/22  05:45    


 


Absolute Retic  0.075 10^6/uL (0.020-0.110)   01/16/22  05:45    


 


Sodium  137 mmol/L (135-145)   01/16/22  05:45    


 


Potassium  4.0 mmol/L (3.5-5.0)   01/16/22  05:45    


 


Chloride  103 mmol/L (101-111)   01/16/22  05:45    


 


Carbon Dioxide  24 mmol/L (21-32)   01/16/22  05:45    


 


Anion Gap  10.0  (6-13)   01/16/22  05:45    


 


BUN  32 mg/dL (6-20)  H  01/16/22  05:45    


 


Creatinine  0.8 mg/dL (0.4-1.0)   01/16/22  05:45    


 


Estimated GFR (MDRD)  68  (>89)  L  01/16/22  05:45    


 


Glucose  237 mg/dL ()  H  01/16/22  05:45    


 


Estimat Average Glucose  163 mg/dL ()  H  01/15/22  03:00    


 


Hemoglobin A1c %  7.3 % (4.27-6.07)  H  01/15/22  03:00    


 


Lactic Acid  0.8 mmol/L (0.5-2.2)   01/15/22  06:13    


 


Calcium  8.3 mg/dL (8.5-10.3)  L  01/16/22  05:45    


 


Iron  59 ug/dL ()   01/16/22  05:45    


 


TIBC  316 ug/dL (250-450)   01/16/22  05:45    


 


% Saturation  19 % (20-50)  L  01/16/22  05:45    


 


Transferrin  226 mg/dL (192-382)   01/16/22  05:45    


 


Ferritin  28.1 ng/mL (11.0-306.8)   01/16/22  05:45    


 


Total Bilirubin  0.5 mg/dL (0.2-1.0)   01/15/22  03:00    


 


AST  24 IU/L (10-42)   01/15/22  03:00    


 


ALT  20 IU/L (10-60)   01/15/22  03:00    


 


Alkaline Phosphatase  66 IU/L ()   01/15/22  03:00    


 


Lactate Dehydrogenase  138 IU/L ()   01/16/22  05:45    


 


Total Creatine Kinase  141 IU/L ()   01/15/22  03:00    


 


Total Creatine Kinase  147 IU/L ()   01/15/22  03:00    


 


Troponin I High Sens  6.8 ng/L (2.3-14.8)   01/15/22  10:55    


 


Total Protein  6.9 g/dL (6.7-8.2)   01/15/22  03:00    


 


Albumin  4.0 g/dL (3.2-5.5)   01/15/22  03:00    


 


Globulin  2.9 g/dL (2.1-4.2)   01/15/22  03:00    


 


Albumin/Globulin Ratio  1.4  (1.0-2.2)   01/15/22  03:00    


 


Lipase  42 U/L (22-51)   01/15/22  03:00    


 


Vitamin B12  477 pg/mL (180-914)   01/16/22  05:45    


 


TSH  1.68 uIU/mL (0.34-5.60)   01/15/22  03:00    


 


Free T4  0.87 ng/dL (0.58-1.64)   01/15/22  03:00    


 


Thyroxine (T4)  8.47 ug/dL (6.09-12.23)   01/15/22  03:00    


 


Urine Color  YELLOW   01/15/22  04:53    


 


Urine Clarity  CLEAR  (CLEAR)   01/15/22  04:53    


 


Urine pH  5.5 PH (5.0-7.5)   01/15/22  04:53    


 


Ur Specific Gravity  1.025  (1.002-1.030)   01/15/22  04:53    


 


Urine Protein  NEGATIVE mg/dL (NEGATIVE)   01/15/22  04:53    


 


Urine Glucose (UA)  NEGATIVE mg/dL (NEGATIVE)   01/15/22  04:53    


 


Urine Ketones  NEGATIVE mg/dL (NEGATIVE)   01/15/22  04:53    


 


Urine Occult Blood  NEGATIVE  (NEGATIVE)   01/15/22  04:53    


 


Urine Nitrite  NEGATIVE  (NEGATIVE)   01/15/22  04:53    


 


Urine Bilirubin  NEGATIVE  (NEGATIVE)   01/15/22  04:53    


 


Urine Urobilinogen  0.2 (NORMAL) E.U./dL (NORMAL)   01/15/22  04:53    


 


Ur Leukocyte Esterase  NEGATIVE  (NEGATIVE)   01/15/22  04:53    


 


Urine RBC  None Seen /HPF (0-5)   01/15/22  04:53    


 


Urine WBC  0-3 /HPF (0-5)   01/15/22  04:53    


 


Ur Squamous Epith Cells  NONE SEEN  (<= Few)   01/15/22  04:53    


 


Urine Bacteria  None Seen /HPF (None Seen)   01/15/22  04:53    


 


Urine Culture Comments  NOT INDICATED   01/15/22  04:53    


 


Nasal Adenovirus (PCR)  NOT DETECTED   01/15/22  04:00    


 


Nasal B. parapertussis DNA (PCR)  NOT DETECTED   01/15/22  04:00    


 


Nasal Coronavir 229E PCR  NOT DETECTED   01/15/22  04:00    


 


Nasal Coronavir HKU1 PCR  NOT DETECTED   01/15/22  04:00    


 


Nasal Coronavir NL63 PCR  NOT DETECTED   01/15/22  04:00    


 


Nasal Coronavir OC43 PCR  NOT DETECTED   01/15/22  04:00    


 


Nasal Enterovir/Rhinovir PCR  NOT DETECTED   01/15/22  04:00    


 


Nasal Influenza B PCR  NOT DETECTED   01/15/22  04:00    


 


Nasal Influenza A PCR  NOT DETECTED   01/15/22  04:00    


 


Nasal Parainfluen 1 PCR  NOT DETECTED   01/15/22  04:00    


 


Nasal Parainfluen 2 PCR  NOT DETECTED   01/15/22  04:00    


 


Nasal Parainfluen 3 PCR  NOT DETECTED   01/15/22  04:00    


 


Nasal Parainfluen 4 PCR  NOT DETECTED   01/15/22  04:00    


 


Nasal RSV (PCR)  NOT DETECTED   01/15/22  04:00    


 


Nasal B.pertussis DNA PCR  NOT DETECTED   01/15/22  04:00    


 


Nasal C.pneumoniae (PCR)  NOT DETECTED   01/15/22  04:00    


 


Tenzin Human Metapneumo PCR  NOT DETECTED   01/15/22  04:00    


 


Nasal M.pneumoniae (PCR)  NOT DETECTED   01/15/22  04:00    


 


Nasal SARS-CoV-2 (PCR)  NOT DETECTED   01/15/22  04:00    


 


Stl Occult Blood (IFOB)  POSITIVE  (NEGATIVE)  A  01/16/22  09:05    


 


Last Dose Date  UNK   01/15/22  03:00    


 


Last Dose Time  K   01/15/22  03:00    


 


Digoxin  0.4 ng/mL  01/15/22  03:00    














- Procedures


Procedures: 





Procedures





CATARAC PHACOEMULS/ASPIR (11/05/14)


INSERT LENS AT CATAR EXT (11/05/14)











ABX Reporting


Has patient been on IV antibiotics over the past 48 hours?: No





Current Medications





- Current Medications


Current Medications: 








Active Medications





Acetaminophen (Acetaminophen 325 Mg Tablet)  650 mg PO Q4HR PRN


   PRN Reason: Pain 1 to 4


Amlodipine Besylate (Amlodipine 5 Mg Tablet)  10 mg PO DAILY [facility]


   Last Admin: 01/16/22 08:31 Dose:  10 mg


   


Carvedilol (Carvedilol 12.5 Mg Tablet)  25 mg PO BIDWM [facility]


   Last Admin: 01/16/22 08:31 Dose:  25 mg


   


Digoxin (Digoxin 125 Mcg Tablet)  125 mcg PO DAILY [facility]


   Last Admin: 01/16/22 08:31 Dose:  125 mcg


   


Sodium Chloride (Normal Saline 0.9%)  1,000 mls @ 100 mls/hr IV .Q10H [facility]


   Stop: 01/17/22 10:59


Insulin Aspart (Insulin Aspart 300 Unit/3 Ml Pen)  3 - 11 unit SUBQ 

0800,1200,1700,2100 [facility]; Protocol


   Last Admin: 01/16/22 11:55 Dose:  3 unit


   


Insulin Aspart (Insulin Aspart 300 Unit/3 Ml Pen)  5 unit SUBQ TIDWM [facility]; 

Protocol


   Last Admin: 01/16/22 11:55 Dose:  5 unit


   


Insulin Glargine (Insulin Glargine 300 Unit/3 Ml Pen)  20 unit SUBQ QPM [facility]


   Last Admin: 01/15/22 20:23 Dose:  20 unit


   


Levetiracetam (Levetiracetam 250 Mg Tablet)  500 mg PO BID [facility]


   Last Admin: 01/16/22 08:31 Dose:  500 mg


   


Lisinopril (Lisinopril 20 Mg Tablet)  20 mg PO BID [facility]


   Last Admin: 01/16/22 08:31 Dose:  20 mg


   


Magnesium Oxide (Magnesium Oxide 400 Mg Tablet)  400 mg PO DAILY [facility]


   Last Admin: 01/16/22 08:31 Dose:  400 mg


   


Multivitamins (Multivitamin Tablet)  1 tab PO DAILYWM [facility]


   Last Admin: 01/16/22 08:31 Dose:  1 tab


   


Ondansetron HCl (Ondansetron 4 Mg/2 Ml Vial)  4 mg IVP Q6HR PRN


   PRN Reason: Nausea / Vomiting


Pantoprazole Sodium (Pantoprazole 40 Mg Vial)  40 mg IVP BID [facility]


   Last Admin: 01/16/22 11:43 Dose:  40 mg


   


Pravastatin Sodium (Pravastatin 40 Mg Tablet)  80 mg PO QPM [facility]


   Last Admin: 01/15/22 20:23 Dose:  80 mg


   


Sodium Chloride (Sodium Chloride Flush 0.9% 10 Ml Syringe)  10 ml IVP PRN PRN


   PRN Reason: AS NEEDED PER PROVIDER ORDERS


   Last Admin: 01/16/22 11:43 Dose:  10 ml


   


Sodium Chloride (Sodium Chloride Flush 0.9% 10 Ml Syringe)  10 ml IVP 

0100,0900,1700 [facility]


   Last Admin: 01/16/22 08:31 Dose:  10 ml


   





                                        





Ascorbic Acid [Vitamin C] 1,000 mg PO DAILY 10/14/14 


Cholecalciferol (Vitamin D3) [Vitamin D] 1,000 unit PO DAILY 10/14/14 


Digoxin [Lanoxin] 125 mcg PO DAILY 10/14/14 


Magnesium Oxide [Magox 400] 400 mg PO DAILY 10/14/14 


Multivitamin [Multi-Day Vitamins] 1 each PO DAILY 10/14/14 


Omega-3/Dha/Epa/Fish Oil [Fish Oil] 1,000 mg PO BID 10/14/14 


Vitamin E 400 unit PO DAILY 10/14/14 


Glucosam/Chond-Msm1/C/Prem/Bor [Glucosa-Chond-MSM Complex Cplt] 1 tab ORAL DAILY

12/10/15 


Insulin Glargine,Hum.rec.anlog [Lantus Solostar] 20 units SQ QPM 12/10/15 


Liraglutide [Victoza 2-Sam] 1.8 mg SUBQ DAILY 12/10/15 


Pravastatin Sodium 80 mg PO QPM 12/10/15 


Amlodipine Besylate [Norvasc] 10 mg PO DAILY 01/15/22 


Carvedilol [Coreg] 25 mg PO BIDWM 01/15/22 


Insulin Aspart [NovoLOG] 10 unit SUBQ QDDINNER 01/15/22 


Insulin Aspart [NovoLOG] 15 unit SUBQ QDBREAKFAST 01/15/22 


Insulin Aspart [NovoLOG] 16 unit SUBQ QDLUNCH 01/15/22 


Levetiracetam [Keppra] 500 mg PO BID 01/15/22 


lisinopriL [Lisinopril] 20 mg PO BID 01/15/22

## 2022-01-17 VITALS — DIASTOLIC BLOOD PRESSURE: 55 MMHG | SYSTOLIC BLOOD PRESSURE: 147 MMHG

## 2022-01-17 LAB
ANION GAP SERPL CALCULATED.4IONS-SCNC: 7 MMOL/L (ref 6–13)
BASOPHILS NFR BLD AUTO: 0 10^3/UL (ref 0–0.1)
BASOPHILS NFR BLD AUTO: 0.3 %
BUN SERPL-MCNC: 33 MG/DL (ref 6–20)
CALCIUM UR-MCNC: 8.3 MG/DL (ref 8.5–10.3)
CHLORIDE SERPL-SCNC: 105 MMOL/L (ref 101–111)
CO2 SERPL-SCNC: 27 MMOL/L (ref 21–32)
CREAT SERPLBLD-SCNC: 0.9 MG/DL (ref 0.4–1)
EOSINOPHIL # BLD AUTO: 0.1 10^3/UL (ref 0–0.7)
EOSINOPHIL NFR BLD AUTO: 1.8 %
ERYTHROCYTE [DISTWIDTH] IN BLOOD BY AUTOMATED COUNT: 13.1 % (ref 12–15)
GFRSERPLBLD MDRD-ARVRAT: 59 ML/MIN/{1.73_M2} (ref 89–?)
GLUCOSE SERPL-MCNC: 126 MG/DL (ref 70–100)
HCT VFR BLD AUTO: 34.7 % (ref 37–47)
HGB UR QL STRIP: 11.2 G/DL (ref 12–16)
LYMPHOCYTES # SPEC AUTO: 1.3 10^3/UL (ref 1.5–3.5)
LYMPHOCYTES NFR BLD AUTO: 32 %
MCH RBC QN AUTO: 29 PG (ref 27–31)
MCHC RBC AUTO-ENTMCNC: 32.3 G/DL (ref 32–36)
MCV RBC AUTO: 89.9 FL (ref 81–99)
MONOCYTES # BLD AUTO: 0.4 10^3/UL (ref 0–1)
MONOCYTES NFR BLD AUTO: 9.9 %
NEUTROPHILS # BLD AUTO: 2.2 10^3/UL (ref 1.5–6.6)
NEUTROPHILS # SNV AUTO: 3.9 X10^3/UL (ref 4.8–10.8)
NEUTROPHILS NFR BLD AUTO: 55.7 %
NRBC # BLD AUTO: 0 /100WBC
NRBC # BLD AUTO: 0 X10^3/UL
PDW BLD AUTO: 10 FL (ref 7.9–10.8)
PLATELET # BLD: 135 10^3/UL (ref 130–450)
POTASSIUM SERPL-SCNC: 4 MMOL/L (ref 3.5–5)
RBC MAR: 3.86 10^6/UL (ref 4.2–5.4)
SODIUM SERPLBLD-SCNC: 139 MMOL/L (ref 135–145)

## 2022-01-17 RX ADMIN — DIGOXIN SCH MCG: 125 TABLET ORAL at 08:21

## 2022-01-17 RX ADMIN — THERA TABS SCH TAB: TAB at 08:21

## 2022-01-17 RX ADMIN — INSULIN ASPART SCH UNIT: 100 INJECTION, SOLUTION INTRAVENOUS; SUBCUTANEOUS at 12:00

## 2022-01-17 RX ADMIN — SODIUM CHLORIDE, PRESERVATIVE FREE SCH ML: 5 INJECTION INTRAVENOUS at 08:24

## 2022-01-17 RX ADMIN — INSULIN ASPART SCH UNIT: 100 INJECTION, SOLUTION INTRAVENOUS; SUBCUTANEOUS at 12:01

## 2022-01-17 RX ADMIN — Medication SCH MG: at 08:22

## 2022-01-17 RX ADMIN — SODIUM CHLORIDE SCH MG: 9 INJECTION, SOLUTION INTRAVENOUS at 08:24

## 2022-01-17 RX ADMIN — INSULIN ASPART SCH: 100 INJECTION, SOLUTION INTRAVENOUS; SUBCUTANEOUS at 08:23

## 2022-01-17 RX ADMIN — SODIUM CHLORIDE, PRESERVATIVE FREE SCH ML: 5 INJECTION INTRAVENOUS at 00:17

## 2022-01-17 RX ADMIN — INSULIN ASPART SCH: 100 INJECTION, SOLUTION INTRAVENOUS; SUBCUTANEOUS at 08:43

## 2022-01-17 NOTE — DISCHARGE PLAN
Discharge Plan


Problem Reviewed?: Yes


Disposition: 06 Home Health Service


Condition: Stable


Diet: Cardiac


Activity Restrictions: Activity as Tolerated


Instruction Topics:  Hypoglycemia


Health Concerns: 


You were admitted on 1/15/2022 with hypothermia, confusion and hypoglycemia. It 

was believed that the hypoglycemia caused the confusion and hypothermia. 


You had been eating very little while taking your diabetic medication which 

included Victoza and insulin.  A mayuri hugger was applied which improved your 

temperature to the normal range.  Your blood glucose was corrected accordingly.


Noted that your hemoglobin dropped from 15 down to 11.5 however this was thought

to be hemodilution from IV hydration.


You had a hemoccult blood test done which was positive. Your hemoglobin was 

monitored over 48 Hours of monitoring your hemoglobin there was no significant 

change your hemoglobin fluctuated between 11.2 and 12.1.  The suspicion for a GI

bleed is low.  You have not experienced any abdominal pain as well.


Consequently you are being discharged.  You may follow-up with your primary care

physician within 1 week or as needed.  Your primary care physician may repeat 

your CBC and if significantly lower hemoglobin is noted then colonoscopy could 

be done in the outpatient setting.


You were seen by physical therapy and it was determined that you will benefit 

from home health PT.


You which are voiced by social work for [facility].


You will be contacted by them when you arrive home sometime in the following 

week.


The above plan was discussed with you you expressed understanding and are 

agreeable to the plan.


No Smoking: If you smoke, Please STOP!  Call [phone] for help.


Follow-up with: 


Michael Hester MD [Primary Care Provider] -

## 2022-01-17 NOTE — PROVIDER PROGRESS NOTE
Assessment/Plan





- Current Meds


Current Meds: 





                               Current Medications











Generic Name Dose Route Start Last Admin





  Trade Name Minerva  PRN Reason Stop Dose Admin


 


Amlodipine Besylate  10 mg  01/16/22 09:00  01/17/22 08:22





  Amlodipine 5 Mg Tablet  PO   10 mg





  DAILY MARYBETH   Administration


 


Carvedilol  25 mg  01/15/22 10:00  01/17/22 08:21





  Carvedilol 12.5 Mg Tablet  PO   25 mg





  BIDWM MARYBETH   Administration


 


Digoxin  125 mcg  01/15/22 09:00  01/17/22 08:21





  Digoxin 125 Mcg Tablet  PO   125 mcg





  DAILY MARYBETH   Administration


 


Dextrose/Sodium Chloride  1,000 mls @ 100 mls/hr  01/17/22 01:00  01/17/22 00:20





  D5ns  IV  01/17/22 10:59  100 mls/hr





  .Q10H MARYBETH   Administration


 


Insulin Aspart  3 - 11 unit  01/15/22 17:18  01/17/22 08:23





  Insulin Aspart 300 Unit/3 Ml Pen  SUBQ   Not Given





  0800,1200,1700,2100 [facility]  





  Protocol  


 


Insulin Aspart  5 unit  01/16/22 08:00  01/16/22 17:04





  Insulin Aspart 300 Unit/3 Ml Pen  SUBQ   5 unit





  TIDWM MARYBETH   Administration





  Protocol  


 


Insulin Glargine  20 unit  01/15/22 21:00  01/16/22 21:30





  Insulin Glargine 300 Unit/3 Ml Pen  SUBQ   20 unit





  QPM MARYBETH   Administration


 


Levetiracetam  500 mg  01/15/22 10:00  01/17/22 08:21





  Levetiracetam 250 Mg Tablet  PO   500 mg





  BID MARYBETH   Administration


 


Lisinopril  20 mg  01/15/22 09:00  01/17/22 08:22





  Lisinopril 20 Mg Tablet  PO   20 mg





  BID MARYBETH   Administration


 


Magnesium Oxide  400 mg  01/15/22 09:00  01/17/22 08:22





  Magnesium Oxide 400 Mg Tablet  PO   400 mg





  DAILY MARYBETH   Administration


 


Multivitamins  1 tab  01/16/22 08:00  01/17/22 08:21





  Multivitamin Tablet  PO   1 tab





  DAILYWM MARYBETH   Administration


 


Pantoprazole Sodium  40 mg  01/16/22 11:00  01/17/22 08:24





  Pantoprazole 40 Mg Vial  IVP   40 mg





  BID MARYBETH   Administration


 


Pravastatin Sodium  80 mg  01/15/22 21:00  01/16/22 21:30





  Pravastatin 40 Mg Tablet  PO   80 mg





  QPM MARYBETH   Administration


 


Sodium Chloride  10 ml  01/15/22 07:19  01/16/22 11:43





  Sodium Chloride Flush 0.9% 10 Ml Syringe  IVP   10 ml





  PRN PRN   Administration





  AS NEEDED PER PROVIDER ORDERS  


 


Sodium Chloride  10 ml  01/15/22 09:00  01/17/22 08:24





  Sodium Chloride Flush 0.9% 10 Ml Syringe  IVP   10 ml





  0100,0900,1700 MARYBETH   Administration














- Lab Result


Fish Bone Diagrams: 


                                 01/17/22 05:55





                                 01/17/22 05:55





Objective


Vital Signs: 





                               Vital Signs - 24 hr











  01/16/22 01/16/22 01/16/22





  11:42 15:41 23:35


 


Temperature 36.7 C 37.0 C 36.7 C


 


Heart Rate [ 65 66 64





Brachial]   


 


Respiratory 18 16 16





Rate   


 


Blood Pressure 143/52 H 126/50 L 





[Left Brachial   





artery]   


 


Blood Pressure   133/57 H





[Right Brachial   





artery]   


 


O2 Saturation 97 95 98














  01/17/22





  08:18


 


Temperature 36.6 C


 


Heart Rate [ 63





Brachial] 


 


Respiratory 16





Rate 


 


Blood Pressure 147/55 H





[Left Brachial 





artery] 


 


Blood Pressure 





[Right Brachial 





artery] 


 


O2 Saturation 








                                     Oxygen











O2 Source                      Room air














I&O (Last 24 Hrs): 





                          Intake and Output Totals x24h











 01/15/22 01/16/22 01/17/22





 23:59 23:59 23:59


 


Intake Total 2440 780 


 


Output Total 1200 975 800


 


Balance 4091 -621 -786














- Results


Results: 





                               Laboratory Results











WBC  3.9 x10^3/uL (4.8-10.8)  L  01/17/22  05:55    


 


RBC  3.86 10^6/uL (4.20-5.40)  L  01/17/22  05:55    


 


Hgb  11.2 g/dL (12.0-16.0)  L  01/17/22  05:55    


 


Hct  34.7 % (37.0-47.0)  L  01/17/22  05:55    


 


MCV  89.9 fL (81.0-99.0)   01/17/22  05:55    


 


MCH  29.0 pg (27.0-31.0)   01/17/22  05:55    


 


MCHC  32.3 g/dL (32.0-36.0)   01/17/22  05:55    


 


RDW  13.1 % (12.0-15.0)   01/17/22  05:55    


 


Plt Count  135 10^3/uL (130-450)   01/17/22  05:55    


 


MPV  10.0 fL (7.9-10.8)   01/17/22  05:55    


 


Reticulocyte % (Auto)  1.90 % (0.5-2.3)   01/16/22  05:45    


 


Neut # (Auto)  2.2 10^3/uL (1.5-6.6)   01/17/22  05:55    


 


Lymph # (Auto)  1.3 10^3/uL (1.5-3.5)  L  01/17/22  05:55    


 


Mono # (Auto)  0.4 10^3/uL (0.0-1.0)   01/17/22  05:55    


 


Eos # (Auto)  0.1 10^3/uL (0.0-0.7)   01/17/22  05:55    


 


Baso # (Auto)  0.0 10^3/uL (0.0-0.1)   01/17/22  05:55    


 


Absolute Nucleated RBC  0.00 x10^3/uL  01/17/22  05:55    


 


Nucleated RBC %  0.0 /100WBC  01/17/22  05:55    


 


Absolute Retic  0.075 10^6/uL (0.020-0.110)   01/16/22  05:45    


 


Sodium  139 mmol/L (135-145)   01/17/22  05:55    


 


Potassium  4.0 mmol/L (3.5-5.0)   01/17/22  05:55    


 


Chloride  105 mmol/L (101-111)   01/17/22  05:55    


 


Carbon Dioxide  27 mmol/L (21-32)   01/17/22  05:55    


 


Anion Gap  7.0  (6-13)   01/17/22  05:55    


 


BUN  33 mg/dL (6-20)  H  01/17/22  05:55    


 


Creatinine  0.9 mg/dL (0.4-1.0)   01/17/22  05:55    


 


Estimated GFR (MDRD)  59  (>89)  L  01/17/22  05:55    


 


Glucose  126 mg/dL ()  H  01/17/22  05:55    


 


Estimat Average Glucose  163 mg/dL ()  H  01/15/22  03:00    


 


Hemoglobin A1c %  7.3 % (4.27-6.07)  H  01/15/22  03:00    


 


Lactic Acid  0.8 mmol/L (0.5-2.2)   01/15/22  06:13    


 


Calcium  8.3 mg/dL (8.5-10.3)  L  01/17/22  05:55    


 


Iron  59 ug/dL ()   01/16/22  05:45    


 


TIBC  316 ug/dL (250-450)   01/16/22  05:45    


 


% Saturation  19 % (20-50)  L  01/16/22  05:45    


 


Transferrin  226 mg/dL (192-382)   01/16/22  05:45    


 


Ferritin  28.1 ng/mL (11.0-306.8)   01/16/22  05:45    


 


Total Bilirubin  0.5 mg/dL (0.2-1.0)   01/15/22  03:00    


 


AST  24 IU/L (10-42)   01/15/22  03:00    


 


ALT  20 IU/L (10-60)   01/15/22  03:00    


 


Alkaline Phosphatase  66 IU/L ()   01/15/22  03:00    


 


Lactate Dehydrogenase  138 IU/L ()   01/16/22  05:45    


 


Total Creatine Kinase  141 IU/L ()   01/15/22  03:00    


 


Total Creatine Kinase  147 IU/L ()   01/15/22  03:00    


 


Troponin I High Sens  6.8 ng/L (2.3-14.8)   01/15/22  10:55    


 


Total Protein  6.9 g/dL (6.7-8.2)   01/15/22  03:00    


 


Albumin  4.0 g/dL (3.2-5.5)   01/15/22  03:00    


 


Globulin  2.9 g/dL (2.1-4.2)   01/15/22  03:00    


 


Albumin/Globulin Ratio  1.4  (1.0-2.2)   01/15/22  03:00    


 


Lipase  42 U/L (22-51)   01/15/22  03:00    


 


Vitamin B12  477 pg/mL (180-914)   01/16/22  05:45    


 


TSH  1.68 uIU/mL (0.34-5.60)   01/15/22  03:00    


 


Free T4  0.87 ng/dL (0.58-1.64)   01/15/22  03:00    


 


Thyroxine (T4)  8.47 ug/dL (6.09-12.23)   01/15/22  03:00    


 


Urine Color  YELLOW   01/15/22  04:53    


 


Urine Clarity  CLEAR  (CLEAR)   01/15/22  04:53    


 


Urine pH  5.5 PH (5.0-7.5)   01/15/22  04:53    


 


Ur Specific Gravity  1.025  (1.002-1.030)   01/15/22  04:53    


 


Urine Protein  NEGATIVE mg/dL (NEGATIVE)   01/15/22  04:53    


 


Urine Glucose (UA)  NEGATIVE mg/dL (NEGATIVE)   01/15/22  04:53    


 


Urine Ketones  NEGATIVE mg/dL (NEGATIVE)   01/15/22  04:53    


 


Urine Occult Blood  NEGATIVE  (NEGATIVE)   01/15/22  04:53    


 


Urine Nitrite  NEGATIVE  (NEGATIVE)   01/15/22  04:53    


 


Urine Bilirubin  NEGATIVE  (NEGATIVE)   01/15/22  04:53    


 


Urine Urobilinogen  0.2 (NORMAL) E.U./dL (NORMAL)   01/15/22  04:53    


 


Ur Leukocyte Esterase  NEGATIVE  (NEGATIVE)   01/15/22  04:53    


 


Urine RBC  None Seen /HPF (0-5)   01/15/22  04:53    


 


Urine WBC  0-3 /HPF (0-5)   01/15/22  04:53    


 


Ur Squamous Epith Cells  NONE SEEN  (<= Few)   01/15/22  04:53    


 


Urine Bacteria  None Seen /HPF (None Seen)   01/15/22  04:53    


 


Urine Culture Comments  NOT INDICATED   01/15/22  04:53    


 


Nasal Adenovirus (PCR)  NOT DETECTED   01/15/22  04:00    


 


Nasal B. parapertussis DNA (PCR)  NOT DETECTED   01/15/22  04:00    


 


Nasal Coronavir 229E PCR  NOT DETECTED   01/15/22  04:00    


 


Nasal Coronavir HKU1 PCR  NOT DETECTED   01/15/22  04:00    


 


Nasal Coronavir NL63 PCR  NOT DETECTED   01/15/22  04:00    


 


Nasal Coronavir OC43 PCR  NOT DETECTED   01/15/22  04:00    


 


Nasal Enterovir/Rhinovir PCR  NOT DETECTED   01/15/22  04:00    


 


Nasal Influenza B PCR  NOT DETECTED   01/15/22  04:00    


 


Nasal Influenza A PCR  NOT DETECTED   01/15/22  04:00    


 


Nasal Parainfluen 1 PCR  NOT DETECTED   01/15/22  04:00    


 


Nasal Parainfluen 2 PCR  NOT DETECTED   01/15/22  04:00    


 


Nasal Parainfluen 3 PCR  NOT DETECTED   01/15/22  04:00    


 


Nasal Parainfluen 4 PCR  NOT DETECTED   01/15/22  04:00    


 


Nasal RSV (PCR)  NOT DETECTED   01/15/22  04:00    


 


Nasal B.pertussis DNA PCR  NOT DETECTED   01/15/22  04:00    


 


Nasal C.pneumoniae (PCR)  NOT DETECTED   01/15/22  04:00    


 


Tenzin Human Metapneumo PCR  NOT DETECTED   01/15/22  04:00    


 


Nasal M.pneumoniae (PCR)  NOT DETECTED   01/15/22  04:00    


 


Nasal SARS-CoV-2 (PCR)  NOT DETECTED   01/15/22  04:00    


 


Stl Occult Blood (IFOB)  POSITIVE  (NEGATIVE)  A  01/16/22  09:05    


 


Last Dose Date  UNK   01/15/22  03:00    


 


Last Dose Time  UNK   01/15/22  03:00    


 


Digoxin  0.4 ng/mL  01/15/22  03:00    














- Procedures


Procedures: 





Procedures





CATARAC PHACOEMULS/ASPIR (11/05/14)


INSERT LENS AT CATAR EXT (11/05/14)

## 2022-01-17 NOTE — DISCHARGE SUMMARY
Discharge Summary


Admit Date: 01/15/22


Discharge Date: 01/17/22


Discharging Provider: Farhana Phillips


Primary Care Provider: Tyler Hester


Code Status: Attempt Resuscitation


Condition at Discharge: Stable


Discharge Disposition: 06 Home Health Service





- DIAGNOSES


Admission Diagnoses: 





Confusion


Hypoglycemia


Hypothermia


Diabetes mellitus


Atrial fibrillation


History of seizures


Hypertension


Discharge Diagnoses with Status of Each Condition: 





Confusion: Acute due to Hypoglycemia. Resolved


Hypoglycemia: Acute Resolved


Hypothermia: Acute. Resolved


Diabetes mellitus: Chronic. Continue home medication. Seen by dietician. Advised

on eating adequately. HgA1C was 7.3


Atrial fibrillation: Continue home medications. To follow up with cardiologist


History of seizures: Chronic. Controlled. Continue home medications


Hypertension: Chronic. Stable. Continue home medications.





- HPI


History of Present Illness: 





This is a 88-years old female with past medical history significant for 

hypertension, hyperlipidemia, atrial fibrillation, diabetic, history of brain 

hemorrhage and TIA and seizure Who brought by EMS to the ER for confusion and 

evaluation for the fall. Now patient is alert and oriented plus 4, her confusion

is resolved. Patient report she lives alone but her daughter just live 3 bloc

[address] away from her and [facility]. Patient 

reported she did not remember why and how she had a fall in the home. EMS did 

Fingerstick on scene, glucose was 68. pt was given amp of D25, her glucose was 

increased to 130 then pt's mental status improved and became A&OX4 en route per 

Triage. pt was also found to have hypothermia in ER even pt was given mayuri 

hugger. CT of head, CXR, and Xray of knee reveals no acute process or fracture. 

Given above medical conditions, medical team was consulted for admission in 

observation unit.


Discussed the care goal with the patient, patient hope to have full code.











- HOSPITAL COURSE


Hospital Course: 





Patient's hypoglycemia was corrected with IV dextrose solution.


She was not seen by the dietitian and educated on adequate nutrition while on 

her diabetic medications which included Victoza and insulin.  Her hemoglobin A1c

was 7.3.


Upon admission she was treated with a bear hugger and her temperatures 

normalized.


With IV hydration her hemoglobin dropped from 15 down to 11.5.  For concern 

about possible bleed she had a Hemoccult test done which was positive.


However over 48 hours hemoglobin stayed between 11.2 and 12.1.  As a result it 

was felt that the patient did not have a GI bleed.  Her blood pressure was also 

stable during this time and patient denied any abdominal pain nausea or 

vomiting.


She is being discharged home in stable condition and has been advised to follow-

up with her primary care physician within a week or as needed.  It is expected 

that her primary care physician will recheck her hemoglobin.








- ALLERGIES


Allergies/Adverse Reactions: 


                                    Allergies











Allergy/AdvReac Type Severity Reaction Status Date / Time


 


codeine AdvReac  Nausea Verified 01/15/22 04:20














- MEDICATIONS


Home Medications: 


                                Ambulatory Orders











 Medication  Instructions  Recorded  Confirmed


 


Ascorbic Acid [Vitamin C] 1,000 mg PO DAILY 10/14/14 01/15/22


 


Cholecalciferol (Vitamin D3) 1,000 unit PO DAILY 10/14/14 01/15/22





[Vitamin D]   


 


Digoxin [Lanoxin] 125 mcg PO DAILY 10/14/14 01/15/22


 


Magnesium Oxide [Magox 400] 400 mg PO DAILY 10/14/14 01/15/22


 


Multivitamin [Multi-Day Vitamins] 1 each PO DAILY 10/14/14 01/15/22


 


Omega-3/Dha/Epa/Fish Oil [Fish Oil 1,000 mg PO BID 10/14/14 01/15/22





Dr 500 mg Softgel]   


 


Vitamin E 400 unit PO DAILY 10/14/14 01/15/22


 


Glucosam/Chond-Msm1/C/Prem/Bor 1 tab ORAL DAILY 12/10/15 01/15/22





[Glucosa-Chond-MSM Complex Cplt]   


 


Insulin Glargine,Hum.rec.anlog 20 units SQ QPM 12/10/15 01/15/22





[Lantus Solostar]   


 


Liraglutide [Victoza 2-Sam] 1.8 mg SUBQ DAILY 12/10/15 01/15/22


 


Pravastatin Sodium 80 mg PO QPM 12/10/15 01/15/22


 


Amlodipine Besylate [Norvasc] 10 mg PO DAILY 01/15/22 01/15/22


 


Carvedilol [Coreg] 25 mg PO BIDWM 01/15/22 01/15/22


 


Insulin Aspart [NovoLOG] 10 unit SUBQ QDDINNER 01/15/22 01/15/22


 


Insulin Aspart [NovoLOG] 15 unit SUBQ QDBREAKFAST 01/15/22 01/15/22


 


Insulin Aspart [NovoLOG] 16 unit SUBQ QDLUNCH 01/15/22 01/15/22


 


Levetiracetam [Keppra] 500 mg PO BID 01/15/22 01/15/22


 


lisinopriL [Lisinopril] 20 mg PO BID 01/15/22 01/15/22














- PHYSICAL EXAM AT DISCHARGE


General Appearance: positive: No acute distress, Alert


Eyes Bilateral: positive: PERRL, EOMI


ENT: positive: No signs of dehydration


Neck: positive: No JVD, Trachea midline


Respiratory: positive: Chest non-tender, No respiratory distress, Breath sounds 

nml


Cardiovascular: positive: Regular rate & rhythm, No murmur


Abdomen: positive: Non-tender, No organomegaly, Nml bowel sounds, No distention.

  negative: Guarding, Rebound


Back: positive: Nml inspection


Skin: positive: Color nml, No rash, Warm, Dry


Extremities: positive: Non-tender, Full ROM, Nml appearance, Pedal edema (trace)


Neurologic/Psychiatric: positive: Oriented x3, Mood/affect nml





- LABS


Result Diagrams: 


                                 01/17/22 05:55





                                 01/17/22 05:55





- TIME SPENT


Time Spent in Discharge (Minutes): 20

## 2022-09-16 ENCOUNTER — HOSPITAL ENCOUNTER (OUTPATIENT)
Dept: HOSPITAL 76 - EMS | Age: 87
Discharge: ANOTHER HOSPITAL | End: 2022-09-16
Payer: MEDICARE

## 2022-09-16 DIAGNOSIS — E11.649: ICD-10-CM

## 2022-09-16 DIAGNOSIS — R47.81: ICD-10-CM

## 2022-09-16 DIAGNOSIS — R41.0: Primary | ICD-10-CM

## 2023-02-20 ENCOUNTER — HOSPITAL ENCOUNTER (OUTPATIENT)
Dept: HOSPITAL 76 - EMS | Age: 88
Discharge: ANOTHER HOSPITAL | End: 2023-02-20
Payer: MEDICARE

## 2023-02-20 ENCOUNTER — HOSPITAL ENCOUNTER (EMERGENCY)
Dept: HOSPITAL 76 - ED | Age: 88
Discharge: HOME | End: 2023-02-20
Payer: MEDICARE

## 2023-02-20 VITALS — DIASTOLIC BLOOD PRESSURE: 66 MMHG | SYSTOLIC BLOOD PRESSURE: 157 MMHG

## 2023-02-20 DIAGNOSIS — G45.9: Primary | ICD-10-CM

## 2023-02-20 DIAGNOSIS — R26.9: ICD-10-CM

## 2023-02-20 DIAGNOSIS — R32: Primary | ICD-10-CM

## 2023-02-20 DIAGNOSIS — Z20.822: ICD-10-CM

## 2023-02-20 DIAGNOSIS — R42: ICD-10-CM

## 2023-02-20 LAB
ALBUMIN DIAFP-MCNC: 3.8 G/DL (ref 3.2–5.5)
ALBUMIN/GLOB SERPL: 1.2 {RATIO} (ref 1–2.2)
ALP SERPL-CCNC: 64 IU/L (ref 42–121)
ALT SERPL W P-5'-P-CCNC: 17 IU/L (ref 10–60)
ANION GAP SERPL CALCULATED.4IONS-SCNC: 10 MMOL/L (ref 6–13)
AST SERPL W P-5'-P-CCNC: 19 IU/L (ref 10–42)
B PARAPERT DNA SPEC QL NAA+PROBE: NOT DETECTED
B PERT DNA SPEC QL NAA+PROBE: NOT DETECTED
BASOPHILS NFR BLD AUTO: 0 10^3/UL (ref 0–0.1)
BASOPHILS NFR BLD AUTO: 0.2 %
BILIRUB BLD-MCNC: 0.9 MG/DL (ref 0.2–1)
BUN SERPL-MCNC: 25 MG/DL (ref 6–20)
C PNEUM DNA NPH QL NAA+NON-PROBE: NOT DETECTED
CALCIUM UR-MCNC: 9.1 MG/DL (ref 8.5–10.3)
CHLORIDE SERPL-SCNC: 100 MMOL/L (ref 101–111)
CLARITY UR REFRACT.AUTO: CLEAR
CO2 SERPL-SCNC: 28 MMOL/L (ref 21–32)
CREAT SERPLBLD-SCNC: 0.8 MG/DL (ref 0.4–1)
EOSINOPHIL # BLD AUTO: 0.1 10^3/UL (ref 0–0.7)
EOSINOPHIL NFR BLD AUTO: 2.2 %
ERYTHROCYTE [DISTWIDTH] IN BLOOD BY AUTOMATED COUNT: 12.6 % (ref 12–15)
FLUAV RNA RESP QL NAA+PROBE: NOT DETECTED
GFRSERPLBLD MDRD-ARVRAT: 68 ML/MIN/{1.73_M2} (ref 89–?)
GLOBULIN SER-MCNC: 3.2 G/DL (ref 2.1–4.2)
GLUCOSE SERPL-MCNC: 139 MG/DL (ref 70–100)
GLUCOSE UR QL STRIP.AUTO: NEGATIVE MG/DL
HAEM INFLU B DNA SPEC QL NAA+PROBE: NOT DETECTED
HCOV 229E RNA SPEC QL NAA+PROBE: NOT DETECTED
HCOV HKU1 RNA UPPER RESP QL NAA+PROBE: NOT DETECTED
HCOV NL63 RNA ASPIRATE QL NAA+PROBE: NOT DETECTED
HCOV OC43 RNA SPEC QL NAA+PROBE: NOT DETECTED
HCT VFR BLD AUTO: 44.3 % (ref 37–47)
HGB UR QL STRIP: 14.2 G/DL (ref 12–16)
HMPV AG SPEC QL: NOT DETECTED
HPIV1 RNA NPH QL NAA+PROBE: NOT DETECTED
HPIV2 SPEC QL CULT: NOT DETECTED
HPIV3 AB TITR SER CF: NOT DETECTED {TITER}
HPIV4 RNA SPEC QL NAA+PROBE: NOT DETECTED
KETONES UR QL STRIP.AUTO: NEGATIVE MG/DL
LIPASE SERPL-CCNC: 57 U/L (ref 22–51)
LYMPHOCYTES # SPEC AUTO: 0.9 10^3/UL (ref 1.5–3.5)
LYMPHOCYTES NFR BLD AUTO: 17.2 %
M PNEUMO DNA SPEC QL NAA+PROBE: NOT DETECTED
MCH RBC QN AUTO: 28.2 PG (ref 27–31)
MCHC RBC AUTO-ENTMCNC: 32.1 G/DL (ref 32–36)
MCV RBC AUTO: 88.1 FL (ref 81–99)
MONOCYTES # BLD AUTO: 0.4 10^3/UL (ref 0–1)
MONOCYTES NFR BLD AUTO: 7.9 %
NEUTROPHILS # BLD AUTO: 4 10^3/UL (ref 1.5–6.6)
NEUTROPHILS # SNV AUTO: 5.5 X10^3/UL (ref 4.8–10.8)
NEUTROPHILS NFR BLD AUTO: 72.3 %
NITRITE UR QL STRIP.AUTO: NEGATIVE
NRBC # BLD AUTO: 0 /100WBC
NRBC # BLD AUTO: 0 X10^3/UL
PDW BLD AUTO: 9.5 FL (ref 7.9–10.8)
PH UR STRIP.AUTO: 7 PH (ref 5–7.5)
PLATELET # BLD: 146 10^3/UL (ref 130–450)
POTASSIUM SERPL-SCNC: 4 MMOL/L (ref 3.5–5)
PROT SPEC-MCNC: 7 G/DL (ref 6.7–8.2)
PROT UR STRIP.AUTO-MCNC: NEGATIVE MG/DL
RBC # UR STRIP.AUTO: NEGATIVE /UL
RBC MAR: 5.03 10^6/UL (ref 4.2–5.4)
RSV RNA RESP QL NAA+PROBE: NOT DETECTED
RV+EV RNA SPEC QL NAA+PROBE: NOT DETECTED
SARS-COV-2 RNA PNL SPEC NAA+PROBE: NOT DETECTED
SODIUM SERPLBLD-SCNC: 138 MMOL/L (ref 135–145)
SP GR UR STRIP.AUTO: 1.01 (ref 1–1.03)
UROBILINOGEN UR QL STRIP.AUTO: (no result) E.U./DL
UROBILINOGEN UR STRIP.AUTO-MCNC: NEGATIVE MG/DL

## 2023-02-20 PROCEDURE — 96360 HYDRATION IV INFUSION INIT: CPT

## 2023-02-20 PROCEDURE — 83690 ASSAY OF LIPASE: CPT

## 2023-02-20 PROCEDURE — 71045 X-RAY EXAM CHEST 1 VIEW: CPT

## 2023-02-20 PROCEDURE — 81001 URINALYSIS AUTO W/SCOPE: CPT

## 2023-02-20 PROCEDURE — 84484 ASSAY OF TROPONIN QUANT: CPT

## 2023-02-20 PROCEDURE — 85025 COMPLETE CBC W/AUTO DIFF WBC: CPT

## 2023-02-20 PROCEDURE — 80053 COMPREHEN METABOLIC PANEL: CPT

## 2023-02-20 PROCEDURE — 93005 ELECTROCARDIOGRAM TRACING: CPT

## 2023-02-20 PROCEDURE — 87633 RESP VIRUS 12-25 TARGETS: CPT

## 2023-02-20 PROCEDURE — 70498 CT ANGIOGRAPHY NECK: CPT

## 2023-02-20 PROCEDURE — 70496 CT ANGIOGRAPHY HEAD: CPT

## 2023-02-20 PROCEDURE — 36415 COLL VENOUS BLD VENIPUNCTURE: CPT

## 2023-02-20 PROCEDURE — 87086 URINE CULTURE/COLONY COUNT: CPT

## 2023-02-20 PROCEDURE — 81003 URINALYSIS AUTO W/O SCOPE: CPT

## 2023-02-20 PROCEDURE — 99284 EMERGENCY DEPT VISIT MOD MDM: CPT

## 2023-02-20 NOTE — XRAY REPORT
PROCEDURE:  Chest 1 View X-Ray

 

INDICATIONS:  chest pain

 

TECHNIQUE:  One view of the chest was acquired.  

 

COMPARISON:  6/15/2022.

 

FINDINGS:  

 

Surgical changes and devices:  None.  

 

Lungs and pleura:  No pleural effusions or pneumothorax.  Lungs are clear.  

 

Mediastinum:  Mediastinal contours appear normal.  Heart size is normal.  

 

Bones and chest wall:  No suspicious bony lesions.  Overlying soft tissues appear unremarkable.  

 

 

IMPRESSION:  

 

No acute cardiopulmonary pathology.

 

No discrepancies.

 

Reviewed by: Efrem Jose MD on 2/20/2023 8:12 AM PST

Approved by: Efrem Jose MD on 2/20/2023 8:12 AM [facility]

 

 

Station ID:  [id number]

## 2023-02-20 NOTE — ED PHYSICIAN DOCUMENTATION
ED Addendum





- Addendum


Addendum: 





02/20/23 07:34





Repeat troponin test results just now and is insignificantly changed going from 

6-7 which are both normal.  She remains pain-free.  She will be discharged and 

Dr. Mccoy had already given her the expectation of discharge and verbal 

instructions and written instructions.

## 2023-02-20 NOTE — CT REPORT
PROCEDURE:  ANGIO HEAD W/WO

 

INDICATIONS:  L side weakness

 

CONTRAST: 100 ML OMNI 300 

 

TECHNIQUE:  

Precontrast 4.5 mm thick angled axial sections acquired from the foramen magnum to the vertex.   Afte
r the administration of intravenous contrast, 1 mm thick sections acquired through the [place] of [place]
is.  Postcontrast 4.5 mm thick sections then re-acquired from the foramen magnum to the vertex.  3-di
mensional maximum-intensity-projection (MIP) and/or volume rendering reformats were acquired of the c
entral intracranial vasculature.  For radiation dose reduction, the following was used:  automated ex
posure control, adjustment of mA and/or kV according to patient size.

 

COMPARISON:  CT head dated 1/15/2022 and 9/16/2022

 

FINDINGS:  

Image quality:  Excellent.  

 

Anterior circulation:  Mild atherosclerotic calcifications are seen within the cavernous ICAs bilater
ally. Intracranial internal carotid arteries are normal in size and flow.  The flow within the paired
 anterior cerebral arteries is normal and symmetric.  The flow within the middle cerebral arteries is
 normal and symmetric.  The anterior communicating artery is seen.  No aneurysms are seen.  

 

Posterior circulation:  Visualized portions of the vertebral arteries demonstrate normal caliber, and
 join to form a normal appearing basilar artery.  Fetal origin of both posterior cerebral arteries ar
ising from the anterior circulation is seen. Flow within the posterior cerebral arteries is normal an
d symmetric.  No aneurysms are seen.  

 

CSF spaces:  Ventricles are normal in size and shape.  Basal cisterns are patent.  No extra-axial flu
id collections.  

 

Brain:  No midline shift.  Age related volume loss and mild white matter chronic small vessel ischemi
c changes are seen. No intracranial bleeds or masses.  Gray-white matter interface appears intact.  

 

Skull and face:  Calvarium and facial bones appear intact, without suspicious lesions.  

 

Sinuses: Opacification of left sphenoid sinus and maxillary sinus is seen. Bilateral mastoids are wel
l aerated.

 

IMPRESSION:  

1. No CT evidence of acute intracranial bleed, midline shift or mass effect. No area of abnormal intr
acranial enhancement.

Area age related volume loss and white matter chronic small vessel ischemic changes unchanged from pr
ior study.

3. No hemodynamically significant stenosis or aneurysm is seen in the intracranial circulation. Fetal
 origin of both posterior cerebral arteries which is a normal variant.

 

No discrepancies.

 

Reviewed by: Efrem Jose MD on 2/20/2023 8:18 AM PST

Approved by: Efrem Jose MD on 2/20/2023 8:18 AM PST

 

 

Station ID:  [id number]

## 2023-02-20 NOTE — CT REPORT
PROCEDURE:  ANGIO NECK W

 

INDICATIONS:  L side weakness

 

CONTRAST: 100 ML OMNI 300 

 

TECHNIQUE:  

After the administration of intravenous contrast, 1.5 mm axial sections acquired from the aortic arch
 to the [place] of [place].  Coronal 3-D maximum intensity projection (MIP) and/or volume rendering ref
ormats were then performed.  For radiation dose reduction, the following was used:  automated exposur
e control, adjustment of mA and/or kV according to patient size.

 

COMPARISON:  None.

 

FINDINGS:  

Image quality:  Excellent.  

 

Carotid system:  The great vessels demonstrate a conventional anatomy as they arise from the aortic a
rch.  The origins of the common carotid arteries appear patent.  Atherosclerotic disease involving bi
lateral carotid bulbs is seen. The common carotid arteries demonstrate normal calibers and courses.  
The bifurcation regions appear normal bilaterally.  The internal carotid arteries demonstrate normal 
caliber and course.  

 

Posterior circulation:  The origins of the vertebral arteries appear patent.  The more superior porti
ons of the vertebral arteries demonstrate normal course and caliber.  They join to form a normal appe
aring basilar artery.  

 

Soft tissues:  Visualized neck soft tissues demonstrate no suspicious abnormalities.  The thyroid is 
normal in size and there are no incidental findings. Opacification of left maxillary sinus is seen.

 

Bones:  No suspicious bony lesions.  Visualized cervical spine appears normally aligned.   

 

IMPRESSION:  

 

1. Mild atherosclerotic disease involving bilateral carotid bulbs. No hemodynamically significant jackelyn
nosis or aneurysm is seen in the neck arteries.

 

Sclerotic densities.

 

The estimate of stenosis included in the report of the imaging study was calculated using the NASCET 
method

 

 

 

CLINICAL RECOMMENDATION STATEMENTS:

In patients <35 years with an ITN detected on CT, MRI, or extrathyroidal ultrasound, the Committee re
commends further evaluation with dedicated thyroid ultrasound if the nodule is "e1 cm and has no susp
icious imaging features, and if the patient has normal life expectancy.

In patients "e35 years with an ITN detected on CT, MRI, or extrathyroidal ultrasound, the Committee r
ecommends further evaluation with dedicated thyroid ultrasound if the nodule is "e1.5 cm and has no s
uspicious imaging features, and if the patient has normal life expectancy. (ACR, 2014)

 

Reviewed by: Efrem Jose MD on 2/20/2023 8:16 AM PST

Approved by: Efrem Jose MD on 2/20/2023 8:16 AM PST

 

 

Station ID:  [id number]

## 2023-02-20 NOTE — ED PHYSICIAN DOCUMENTATION
History of Present Illness





- Stated complaint


Stated Complaint: LEFT ARM FEELS WEIRD





- History obtained from


History obtained from: Patient, EMS





- Additonal information


Additional information: 


The patient comes to the emergency department via EMS for chief complaint of 

"not feeling right this morning".  She states that she woke up in the middle the

night to realize she had urinated in her bed, and but when she got up to go to 

the bathroom, she felt as though she was disoriented and a little lightheaded 

and it was hard for her to get going walking.  When she did walk, she states 

that her left leg felt "different".  She is not really sure if it was week or 

not but felt different when she was walking than the right.  She does deny any 

dragging of the leg.  The patient states that this feeling of the leg being 

different, which she thought perhaps was a weak feeling, persisted throughout 

her ride here but she states it is finally starting to resolve.  She states she 

also has a sense of very slight discomfort in her left chest but she cannot 

really describe it more than that.  She thinks she might be short of breath but 

is not quite sure.  The patient denies any other complaints at this time.  She 

denies history of CVA previously.  She has a history of atrial fibrillation but 

no coronary artery disease that she knows of.








PD PAST MEDICAL HISTORY





- Past Medical History


Cardiovascular: Hypertension, High cholesterol, Atrial fibrillation


Respiratory: None


Neuro: Other


Endocrine/Autoimmune: Type 2 diabetes


GI: Hiatal hernia


: None


HEENT: None


Psych: None


Musculoskeletal: None


Derm: None





- Past Surgical History


Past Surgical History: Yes


General: Cholecystectomy, Appendectomy


Ortho: Other


/GYN: Hysterectomy


HEENT: Tonsil/Adenoidectomy





- Present Medications


Home Medications: 


                                Ambulatory Orders











 Medication  Instructions  Recorded  Confirmed


 


Ascorbic Acid [Vitamin C] 1,000 mg PO DAILY 10/14/14 01/15/22


 


Cholecalciferol (Vitamin D3) 1,000 unit PO DAILY 10/14/14 01/15/22





[Vitamin D]   


 


Digoxin [Lanoxin] 125 mcg PO DAILY 10/14/14 02/20/23


 


Magnesium Oxide [Magox 400] 400 mg PO DAILY 10/14/14 02/20/23


 


Multivitamin [Multi-Day Vitamins] 1 each PO DAILY 10/14/14 01/15/22


 


Omega-3/Dha/Epa/Fish Oil [Fish Oil 1,000 mg PO BID 10/14/14 01/15/22





Dr 500 mg Softgel]   


 


Vitamin E 400 unit PO DAILY 10/14/14 01/15/22


 


Glucosam/Chond-Msm1/C/Prem/Bor 1 tab ORAL DAILY 12/10/15 01/15/22





[Glucosa-Chond-MSM Complex Cplt]   


 


Insulin Glargine,Hum.rec.anlog 20 units SQ QPM 12/10/15 01/15/22





[Lantus Solostar]   


 


Liraglutide [Victoza 2-Sam] 1.8 mg SUBQ DAILY 12/10/15 02/20/23


 


Pravastatin Sodium 80 mg PO QPM 12/10/15 01/15/22


 


Carvedilol [Coreg] 25 mg PO BIDWM 01/15/22 02/20/23


 


Insulin Aspart [NovoLOG] 10 unit SUBQ QDDINNER 01/15/22 02/20/23


 


Insulin Aspart [NovoLOG] 15 unit SUBQ QDBREAKFAST 01/15/22 02/20/23


 


Insulin Aspart [NovoLOG] 16 unit SUBQ QDLUNCH 01/15/22 02/20/23


 


Levetiracetam [Keppra] 500 mg PO BID 01/15/22 02/20/23


 


lisinopriL [Lisinopril] 20 mg PO BID 01/15/22 02/20/23


 


Nitroglycerin [Nitrostat] 0.4 mg PRN 02/20/23 














- Allergies


Allergies/Adverse Reactions: 


                                    Allergies











Allergy/AdvReac Type Severity Reaction Status Date / Time


 


codeine AdvReac  Nausea Verified 02/20/23 05:32














- Social History


Does the pt smoke?: No


Smoking Status: Never smoker


Does the pt drink ETOH?: Yes


Does the pt have substance abuse?: No





- Immunizations


Immunizations are current?: Yes





- POLST


Patient has POLST: No





PD ED PE NORMAL





- Vitals


Vital signs reviewed: Yes





- General


General: Alert and oriented X 3, No acute distress, Well developed/nourished





- HEENT


HEENT: Atraumatic, PERRL, EOMI, Moist mucous membranes





- Neck


Neck: Supple, no meningeal sign





- Cardiac


Cardiac: RRR, No murmur, Strong equal pulses





- Respiratory


Respiratory: No respiratory distress, Clear bilaterally





- Abdomen


Abdomen: Soft, Non tender, Non distended





- Derm


Derm: Normal color, Warm and dry, No rash





- Extremities


Extremities: No deformity





- Neuro


Neuro: Alert and oriented X 3, CNs 2-12 intact, No motor deficit (5+ and equal 

strength in bilateral upper and lower extremities.), No sensory deficit, Normal 

speech





- Psych


Psych: Normal mood, Normal affect





Results





- Vitals


Vitals: 


                                     Oxygen











O2 Source                      Room air

















- EKG (time done)


  ** 0517


Rate: Rate (enter#) (61)


Rhythm: Atrial flutter


Axis: Normal


QRS: Normal


Ischemia: Normal ST segments, Non specific changes


Compare to prior EKG: Old EKG unavailable


Computer interpretation: Agree with computer





- Labs


Labs: 


                                Laboratory Tests











  02/20/23 02/20/23 02/20/23





  05:15 05:15 05:15


 


WBC  5.5  


 


RBC  5.03  


 


Hgb  14.2  


 


Hct  44.3  


 


MCV  88.1  


 


MCH  28.2  


 


MCHC  32.1  


 


RDW  12.6  


 


Plt Count  146  


 


MPV  9.5  


 


Neut # (Auto)  4.0  


 


Lymph # (Auto)  0.9 L  


 


Mono # (Auto)  0.4  


 


Eos # (Auto)  0.1  


 


Baso # (Auto)  0.0  


 


Absolute Nucleated RBC  0.00  


 


Nucleated RBC %  0.0  


 


Sodium   138 


 


Potassium   4.0 


 


Chloride   100 L 


 


Carbon Dioxide   28 


 


Anion Gap   10.0 


 


BUN   25 H 


 


Creatinine   0.8 


 


Estimated GFR (MDRD)   68 L 


 


Glucose   139 H 


 


Calcium   9.1 


 


Total Bilirubin   0.9 


 


AST   19 


 


ALT   17 


 


Alkaline Phosphatase   64 


 


Troponin I High Sens    6.7


 


Total Protein   7.0 


 


Albumin   3.8 


 


Globulin   3.2 


 


Albumin/Globulin Ratio   1.2 


 


Lipase   57 H 


 


Urine Color   


 


Urine Clarity   


 


Urine pH   


 


Ur Specific Gravity   


 


Urine Protein   


 


Urine Glucose (UA)   


 


Urine Ketones   


 


Urine Occult Blood   


 


Urine Nitrite   


 


Urine Bilirubin   


 


Urine Urobilinogen   


 


Ur Leukocyte Esterase   


 


Ur Microscopic Review   


 


Urine Culture Comments   


 


Nasal Adenovirus (PCR)   


 


Nasal B. parapertussis DNA (PCR)   


 


Nasal Coronavir 229E PCR   


 


Nasal Coronavir HKU1 PCR   


 


Nasal Coronavir NL63 PCR   


 


Nasal Coronavir OC43 PCR   


 


Nasal Enterovir/Rhinovir PCR   


 


Nasal Influenza B PCR   


 


Nasal Influenza A PCR   


 


Nasal Parainfluen 1 PCR   


 


Nasal Parainfluen 2 PCR   


 


Nasal Parainfluen 3 PCR   


 


Nasal Parainfluen 4 PCR   


 


Nasal RSV (PCR)   


 


Nasal B.pertussis DNA PCR   


 


Nasal C.pneumoniae (PCR)   


 


Tenzin Human Metapneumo PCR   


 


Nasal M.pneumoniae (PCR)   


 


Nasal SARS-CoV-2 (PCR)   














  02/20/23 02/20/23 02/20/23





  05:36 06:40 07:07


 


WBC   


 


RBC   


 


Hgb   


 


Hct   


 


MCV   


 


MCH   


 


MCHC   


 


RDW   


 


Plt Count   


 


MPV   


 


Neut # (Auto)   


 


Lymph # (Auto)   


 


Mono # (Auto)   


 


Eos # (Auto)   


 


Baso # (Auto)   


 


Absolute Nucleated RBC   


 


Nucleated RBC %   


 


Sodium   


 


Potassium   


 


Chloride   


 


Carbon Dioxide   


 


Anion Gap   


 


BUN   


 


Creatinine   


 


Estimated GFR (MDRD)   


 


Glucose   


 


Calcium   


 


Total Bilirubin   


 


AST   


 


ALT   


 


Alkaline Phosphatase   


 


Troponin I High Sens    7.3


 


Total Protein   


 


Albumin   


 


Globulin   


 


Albumin/Globulin Ratio   


 


Lipase   


 


Urine Color   YELLOW 


 


Urine Clarity   CLEAR 


 


Urine pH   7.0 


 


Ur Specific Gravity   1.015 


 


Urine Protein   NEGATIVE 


 


Urine Glucose (UA)   NEGATIVE 


 


Urine Ketones   NEGATIVE 


 


Urine Occult Blood   NEGATIVE 


 


Urine Nitrite   NEGATIVE 


 


Urine Bilirubin   NEGATIVE 


 


Urine Urobilinogen   0.2 (NORMAL) 


 


Ur Leukocyte Esterase   NEGATIVE 


 


Ur Microscopic Review   NOT INDICATED 


 


Urine Culture Comments   NOT INDICATED 


 


Nasal Adenovirus (PCR)  NOT DETECTED  


 


Nasal B. parapertussis DNA (PCR)  NOT DETECTED  


 


Nasal Coronavir 229E PCR  NOT DETECTED  


 


Nasal Coronavir HKU1 PCR  NOT DETECTED  


 


Nasal Coronavir NL63 PCR  NOT DETECTED  


 


Nasal Coronavir OC43 PCR  NOT DETECTED  


 


Nasal Enterovir/Rhinovir PCR  NOT DETECTED  


 


Nasal Influenza B PCR  NOT DETECTED  


 


Nasal Influenza A PCR  NOT DETECTED  


 


Nasal Parainfluen 1 PCR  NOT DETECTED  


 


Nasal Parainfluen 2 PCR  NOT DETECTED  


 


Nasal Parainfluen 3 PCR  NOT DETECTED  


 


Nasal Parainfluen 4 PCR  NOT DETECTED  


 


Nasal RSV (PCR)  NOT DETECTED  


 


Nasal B.pertussis DNA PCR  NOT DETECTED  


 


Nasal C.pneumoniae (PCR)  NOT DETECTED  


 


Tenzin Human Metapneumo PCR  NOT DETECTED  


 


Nasal M.pneumoniae (PCR)  NOT DETECTED  


 


Nasal SARS-CoV-2 (PCR)  NOT DETECTED  














PD Medical Decision Making





- ED course


Complexity details: reviewed results, re-evaluated patient, considered 

differential, d/w patient, d/w family


ED course: 


The patient was given a liter of 0.9 normal saline and worked up with laboratory

 studies including CBC, ER abdominal panel, and troponin, as well as UA, 

respiratory PCR, chest x-ray, and CT/CT angio of the head and CT angio of the 

neck.  All of these tests were ordered and reviewed by me.  The CTs were all 

unremarkable, and UA and respiratory PCR were negative.  CBC and ER abdominal 

panel showed no significant abnormalities, and troponin was normal.  I felt the 

pt should have a repeat troponin, and this was ordered and pending at this time.

  I discussed with pt, who is feeling much better, and her family, that I do not

 find evidence of a stroke or other emergent pathology at this time.  If the 

repeat troponin is negative, I anticipate pt will be able to be discharged home,

 and pt and family are agreeable to this plan.  Pt is signed out to the oncoming

 EDMD Dr. Philip at change of shift, pending repeat troponin.








Departure





- Departure


Disposition: 01 Home, Self Care


Clinical Impression: 


 TIA (transient ischemic attack), Dizziness





Condition: Stable


Instructions:  ED Dizziness [facility], ED Transient Ischemic Attack


Comments: 


Extensive work-up today, including blood work, urinalysis, viral panel, chest x-

ray, and CT scans of the head and neck have all looked good.  It is not clear 

exactly what caused your episode of disorientation, lightheadedness, and left 

side "feeling funny" this morning.  It is possible he had a very tiny, brief str

rehana which quickly recovered.  There is no lasting inflammation to show up on 

your CT scans at this time.  You are feeling much better and you are stable for 

discharge home.  Please consider taking an aspirin each day since you do have 

the atrial fibrillation and Aspirin has been shown to be effective in preventing

 strokes.  Please follow-up with your primary care physician for further 

concerns.


Discharge Date/Time: 02/20/23 08:17

## 2023-07-12 ENCOUNTER — HOSPITAL ENCOUNTER (OUTPATIENT)
Dept: HOSPITAL 76 - EMS | Age: 88
Discharge: ANOTHER HOSPITAL | End: 2023-07-12
Payer: MEDICARE

## 2023-07-12 ENCOUNTER — HOSPITAL ENCOUNTER (EMERGENCY)
Dept: HOSPITAL 76 - ED | Age: 88
Discharge: HOME | End: 2023-07-12
Payer: MEDICARE

## 2023-07-12 VITALS — DIASTOLIC BLOOD PRESSURE: 63 MMHG | SYSTOLIC BLOOD PRESSURE: 158 MMHG

## 2023-07-12 DIAGNOSIS — E11.649: Primary | ICD-10-CM

## 2023-07-12 DIAGNOSIS — Z79.899: ICD-10-CM

## 2023-07-12 DIAGNOSIS — R41.0: ICD-10-CM

## 2023-07-12 DIAGNOSIS — Z79.4: ICD-10-CM

## 2023-07-12 DIAGNOSIS — R61: ICD-10-CM

## 2023-07-12 LAB
ALBUMIN DIAFP-MCNC: 4 G/DL (ref 3.2–5.5)
ALBUMIN/GLOB SERPL: 1.4 {RATIO} (ref 1–2.2)
ALP SERPL-CCNC: 61 IU/L (ref 42–121)
ALT SERPL W P-5'-P-CCNC: 22 IU/L (ref 10–60)
ANION GAP SERPL CALCULATED.4IONS-SCNC: 8 MMOL/L (ref 6–13)
AST SERPL W P-5'-P-CCNC: 27 IU/L (ref 10–42)
BASOPHILS NFR BLD AUTO: 0 10^3/UL (ref 0–0.1)
BASOPHILS NFR BLD AUTO: 0.2 %
BILIRUB BLD-MCNC: 0.6 MG/DL (ref 0.2–1)
BUN SERPL-MCNC: 24 MG/DL (ref 6–20)
CALCIUM UR-MCNC: 9.1 MG/DL (ref 8.5–10.3)
CHLORIDE SERPL-SCNC: 102 MMOL/L (ref 101–111)
CLARITY UR REFRACT.AUTO: CLEAR
CO2 SERPL-SCNC: 29 MMOL/L (ref 21–32)
CREAT SERPLBLD-SCNC: 0.7 MG/DL (ref 0.4–1)
EOSINOPHIL # BLD AUTO: 0.1 10^3/UL (ref 0–0.7)
EOSINOPHIL NFR BLD AUTO: 1.1 %
ERYTHROCYTE [DISTWIDTH] IN BLOOD BY AUTOMATED COUNT: 12.2 % (ref 12–15)
GFRSERPLBLD MDRD-ARVRAT: 79 ML/MIN/{1.73_M2} (ref 89–?)
GLOBULIN SER-MCNC: 2.9 G/DL (ref 2.1–4.2)
GLUCOSE SERPL-MCNC: 115 MG/DL (ref 70–100)
GLUCOSE UR QL STRIP.AUTO: 100 MG/DL
HCT VFR BLD AUTO: 43.3 % (ref 37–47)
HGB UR QL STRIP: 14.2 G/DL (ref 12–16)
KETONES UR QL STRIP.AUTO: NEGATIVE MG/DL
LYMPHOCYTES # SPEC AUTO: 0.8 10^3/UL (ref 1.5–3.5)
LYMPHOCYTES NFR BLD AUTO: 12.5 %
MCH RBC QN AUTO: 29.2 PG (ref 27–31)
MCHC RBC AUTO-ENTMCNC: 32.8 G/DL (ref 32–36)
MCV RBC AUTO: 88.9 FL (ref 81–99)
MONOCYTES # BLD AUTO: 0.6 10^3/UL (ref 0–1)
MONOCYTES NFR BLD AUTO: 8.8 %
NEUTROPHILS # BLD AUTO: 4.8 10^3/UL (ref 1.5–6.6)
NEUTROPHILS # SNV AUTO: 6.3 X10^3/UL (ref 4.8–10.8)
NEUTROPHILS NFR BLD AUTO: 77.1 %
NITRITE UR QL STRIP.AUTO: NEGATIVE
NRBC # BLD AUTO: 0 /100WBC
NRBC # BLD AUTO: 0 X10^3/UL
PDW BLD AUTO: 9.6 FL (ref 7.9–10.8)
PH UR STRIP.AUTO: 7 PH (ref 5–7.5)
PLATELET # BLD: 134 10^3/UL (ref 130–450)
POTASSIUM SERPL-SCNC: 3.4 MMOL/L (ref 3.5–5)
PROT SPEC-MCNC: 6.9 G/DL (ref 6.7–8.2)
PROT UR STRIP.AUTO-MCNC: 30 MG/DL
RBC # UR STRIP.AUTO: NEGATIVE /UL
RBC # URNS HPF: (no result) /HPF (ref 0–5)
RBC MAR: 4.87 10^6/UL (ref 4.2–5.4)
SODIUM SERPLBLD-SCNC: 139 MMOL/L (ref 135–145)
SP GR UR STRIP.AUTO: 1.01 (ref 1–1.03)
SQUAMOUS URNS QL MICRO: (no result)
UROBILINOGEN UR QL STRIP.AUTO: (no result) E.U./DL
UROBILINOGEN UR STRIP.AUTO-MCNC: NEGATIVE MG/DL
WBC # UR MANUAL: (no result) /HPF (ref 0–5)

## 2023-07-12 PROCEDURE — 87086 URINE CULTURE/COLONY COUNT: CPT

## 2023-07-12 PROCEDURE — 80053 COMPREHEN METABOLIC PANEL: CPT

## 2023-07-12 PROCEDURE — 36415 COLL VENOUS BLD VENIPUNCTURE: CPT

## 2023-07-12 PROCEDURE — 81001 URINALYSIS AUTO W/SCOPE: CPT

## 2023-07-12 PROCEDURE — 85025 COMPLETE CBC W/AUTO DIFF WBC: CPT

## 2023-07-12 PROCEDURE — 81003 URINALYSIS AUTO W/O SCOPE: CPT

## 2023-07-12 PROCEDURE — 99283 EMERGENCY DEPT VISIT LOW MDM: CPT

## 2023-07-12 NOTE — ED PHYSICIAN DOCUMENTATION
History of Present Illness





- Stated complaint


Stated Complaint: HYPOGLYCEMIA





- Chief complaint


Chief Complaint: General





- History obtained from


History obtained from: Patient





- History of Present Illness


Timing: Today


Pain level max: 0


Pain level now: 0





- Additonal information


Additional information: 





89-year-old female with a history of diabetes presents to the emergency 

department for low blood sugar at home today.  Reportedly 28 with EMS.  Given 

glucagon and D50 prior to arrival.  Patient states that she feels completely 

normal now.  She states she ate lunch earlier than usual and took her insulin a 

few hours after eating lunch.  No fevers.  No chills.  No nausea or vomiting.  

No urinary symptoms.  No rhinorrhea, cough, congestion.





Review of Systems


Constitutional: denies: Fever, Chills


GI: denies: Vomiting, Diarrhea


Skin: denies: Rash





PD PAST MEDICAL HISTORY





- Past Medical History


Cardiovascular: Hypertension, High cholesterol, Atrial fibrillation


Respiratory: None


Neuro: Other


Endocrine/Autoimmune: Type 2 diabetes


GI: Hiatal hernia


: None


HEENT: None


Psych: None


Musculoskeletal: None


Derm: None





- Past Surgical History


Past Surgical History: Yes


General: Cholecystectomy, Appendectomy


Ortho: Other


/GYN: Hysterectomy


HEENT: Tonsil/Adenoidectomy





- Present Medications


Home Medications: 


                                Ambulatory Orders











 Medication  Instructions  Recorded  Confirmed


 


Ascorbic Acid [Vitamin C] 1,000 mg PO DAILY 10/14/14 01/15/22


 


Cholecalciferol (Vitamin D3) 1,000 unit PO DAILY 10/14/14 01/15/22





[Vitamin D]   


 


Digoxin [Lanoxin] 125 mcg PO DAILY 10/14/14 02/20/23


 


Magnesium Oxide [Magox 400] 400 mg PO DAILY 10/14/14 02/20/23


 


Multivitamin [Multi-Day Vitamins] 1 each PO DAILY 10/14/14 01/15/22


 


Omega-3/Dha/Epa/Fish Oil [Fish Oil 1,000 mg PO BID 10/14/14 01/15/22





Dr 500 mg Softgel]   


 


Vitamin E 400 unit PO DAILY 10/14/14 01/15/22


 


Glucosam/Chond-Msm1/C/Prem/Bor 1 tab ORAL DAILY 12/10/15 01/15/22





[Glucosa-Chond-MSM Complex Cplt]   


 


Insulin Glargine,Hum.rec.anlog 20 units SQ QPM 12/10/15 01/15/22





[Lantus Solostar]   


 


Liraglutide [Victoza 2-Sam] 1.8 mg SUBQ DAILY 12/10/15 02/20/23


 


Pravastatin Sodium 80 mg PO QPM 12/10/15 01/15/22


 


Carvedilol [Coreg] 25 mg PO BIDWM 01/15/22 02/20/23


 


Insulin Aspart [NovoLOG] 10 unit SUBQ QDDINNER 01/15/22 02/20/23


 


Insulin Aspart [NovoLOG] 15 unit SUBQ QDBREAKFAST 01/15/22 02/20/23


 


Insulin Aspart [NovoLOG] 16 unit SUBQ QDLUNCH 01/15/22 02/20/23


 


Levetiracetam [Keppra] 500 mg PO BID 01/15/22 02/20/23


 


lisinopriL [Lisinopril] 20 mg PO BID 01/15/22 02/20/23


 


Nitroglycerin [Nitrostat] 0.4 mg PRN 02/20/23 














- Allergies


Allergies/Adverse Reactions: 


                                    Allergies











Allergy/AdvReac Type Severity Reaction Status Date / Time


 


codeine AdvReac  Nausea Verified 07/12/23 16:22














- Social History


Does the pt smoke?: No


Smoking Status: Never smoker


Does the pt drink ETOH?: Yes


Does the pt have substance abuse?: No





- Immunizations


Immunizations are current?: Yes





- POLST


Patient has POLST: No





PD ED PE NORMAL





- Vitals


Vital signs reviewed: Yes





- General


General: Alert and oriented X 3, No acute distress





- HEENT


HEENT: PERRL, Moist mucous membranes, Pharynx benign





- Neck


Neck: Supple, no meningeal sign





- Cardiac


Cardiac: RRR, Strong equal pulses





- Respiratory


Respiratory: No respiratory distress, Clear bilaterally





- Abdomen


Abdomen: Soft, Non tender, Non distended





- Derm


Derm: Warm and dry





- Extremities


Extremities: No edema





- Neuro


Neuro: Alert and oriented X 3, CNs 2-12 intact, No motor deficit, No sensory 

deficit, Normal speech


Eye Opening: Spontaneous


Motor: Obeys Commands


Verbal: Oriented


GCS Score: 15





- Psych


Psych: Normal mood, Normal affect





Results





- Vitals


Vitals: 


                               Vital Signs - 24 hr











  07/12/23





  18:22


 


Heart Rate 55 L


 


Respiratory 16





Rate 


 


Blood Pressure 158/63 H


 


O2 Saturation 99








                                     Oxygen











O2 Source                      Room air

















- Labs


Labs: 


                                Laboratory Tests











  07/12/23 07/12/23 07/12/23





  14:56 14:56 16:46


 


WBC  6.3  


 


RBC  4.87  


 


Hgb  14.2  


 


Hct  43.3  


 


MCV  88.9  


 


MCH  29.2  


 


MCHC  32.8  


 


RDW  12.2  


 


Plt Count  134  


 


MPV  9.6  


 


Neut # (Auto)  4.8  


 


Lymph # (Auto)  0.8 L  


 


Mono # (Auto)  0.6  


 


Eos # (Auto)  0.1  


 


Baso # (Auto)  0.0  


 


Absolute Nucleated RBC  0.00  


 


Nucleated RBC %  0.0  


 


Sodium   139 


 


Potassium   3.4 L 


 


Chloride   102 


 


Carbon Dioxide   29 


 


Anion Gap   8.0 


 


BUN   24 H 


 


Creatinine   0.7 


 


Estimated GFR (MDRD)   79 L 


 


Glucose   115 H 


 


POC Whole Bld Glucose    122 H


 


Calcium   9.1 


 


Total Bilirubin   0.6 


 


AST   27 


 


ALT   22 


 


Alkaline Phosphatase   61 


 


Total Protein   6.9 


 


Albumin   4.0 


 


Globulin   2.9 


 


Albumin/Globulin Ratio   1.4 


 


Urine Color   


 


Urine Clarity   


 


Urine pH   


 


Ur Specific Gravity   


 


Urine Protein   


 


Urine Glucose (UA)   


 


Urine Ketones   


 


Urine Occult Blood   


 


Urine Nitrite   


 


Urine Bilirubin   


 


Urine Urobilinogen   


 


Ur Leukocyte Esterase   


 


Urine RBC   


 


Urine WBC   


 


Ur Squamous Epith Cells   


 


Urine Bacteria   


 


Ur Microscopic Review   


 


Urine Culture Comments   














  07/12/23 07/12/23





  17:34 18:50


 


WBC  


 


RBC  


 


Hgb  


 


Hct  


 


MCV  


 


MCH  


 


MCHC  


 


RDW  


 


Plt Count  


 


MPV  


 


Neut # (Auto)  


 


Lymph # (Auto)  


 


Mono # (Auto)  


 


Eos # (Auto)  


 


Baso # (Auto)  


 


Absolute Nucleated RBC  


 


Nucleated RBC %  


 


Sodium  


 


Potassium  


 


Chloride  


 


Carbon Dioxide  


 


Anion Gap  


 


BUN  


 


Creatinine  


 


Estimated GFR (MDRD)  


 


Glucose  


 


POC Whole Bld Glucose   129 H


 


Calcium  


 


Total Bilirubin  


 


AST  


 


ALT  


 


Alkaline Phosphatase  


 


Total Protein  


 


Albumin  


 


Globulin  


 


Albumin/Globulin Ratio  


 


Urine Color  YELLOW 


 


Urine Clarity  CLEAR 


 


Urine pH  7.0 


 


Ur Specific Gravity  1.015 


 


Urine Protein  30 H 


 


Urine Glucose (UA)  100 H 


 


Urine Ketones  NEGATIVE 


 


Urine Occult Blood  NEGATIVE 


 


Urine Nitrite  NEGATIVE 


 


Urine Bilirubin  NEGATIVE 


 


Urine Urobilinogen  0.2 (NORMAL) 


 


Ur Leukocyte Esterase  NEGATIVE 


 


Urine RBC  0-5 


 


Urine WBC  0-3 


 


Ur Squamous Epith Cells  RARE Squamous 


 


Urine Bacteria  Rare 


 


Ur Microscopic Review  INDICATED 


 


Urine Culture Comments  NOT INDICATED 














PD Medical Decision Making





- ED course


Complexity details: reviewed results, re-evaluated patient, considered 

differential, d/w patient, d/w family


ED course: 





89-year-old female with hypoglycemia prior to arrival.  Resolved with D50 and 

glucagon.  Eating and drinking without difficulty here.  No recurrent 

hypoglycemia.  No other significant lab abnormalities.  Family will be 

monitoring her at home today and they are comfortable taking her home.  She is 

asymptomatic currently.  Patient counseled to make sure that she eats when she 

takes her insulin.  Patient counseled regarding signs and symptoms for which I 

believe and urgent re-evaluation would be necessary. Patient with good 

understanding of and agreement to plan and is comfortable going home at this 

time





This document was made in part using voice recognition software. While efforts 

are made to proofread this document, sound alike and grammatical errors may 

occur.





Departure





- Departure


Disposition: 01 Home, Self Care


Clinical Impression: 


 Hypoglycemia





Condition: Good


Instructions:  ED Diabetes Hypoglycemia Insulin React


Follow-Up: 


Michael Hester MD [Primary Care Provider] - Within 1 week


Comments: 


Please follow up with your doctor for further care. Continue to check your blood

sugar at home. Return if you worsen. 


Discharge Date/Time: 07/12/23 19:03